# Patient Record
Sex: MALE | Race: OTHER | NOT HISPANIC OR LATINO
[De-identification: names, ages, dates, MRNs, and addresses within clinical notes are randomized per-mention and may not be internally consistent; named-entity substitution may affect disease eponyms.]

---

## 2017-03-20 ENCOUNTER — APPOINTMENT (OUTPATIENT)
Dept: CARDIOLOGY | Facility: CLINIC | Age: 52
End: 2017-03-20

## 2017-03-20 VITALS
HEART RATE: 56 BPM | DIASTOLIC BLOOD PRESSURE: 92 MMHG | WEIGHT: 255 LBS | BODY MASS INDEX: 35.7 KG/M2 | HEIGHT: 71 IN | SYSTOLIC BLOOD PRESSURE: 148 MMHG

## 2017-03-29 ENCOUNTER — APPOINTMENT (OUTPATIENT)
Dept: CARDIOLOGY | Facility: CLINIC | Age: 52
End: 2017-03-29

## 2017-08-08 ENCOUNTER — EMERGENCY (EMERGENCY)
Facility: HOSPITAL | Age: 52
LOS: 0 days | Discharge: HOME | End: 2017-08-08
Admitting: INTERNAL MEDICINE

## 2017-08-08 DIAGNOSIS — K02.9 DENTAL CARIES, UNSPECIFIED: ICD-10-CM

## 2017-08-08 DIAGNOSIS — E11.9 TYPE 2 DIABETES MELLITUS WITHOUT COMPLICATIONS: ICD-10-CM

## 2017-08-08 DIAGNOSIS — R06.09 OTHER FORMS OF DYSPNEA: ICD-10-CM

## 2017-08-08 DIAGNOSIS — I10 ESSENTIAL (PRIMARY) HYPERTENSION: ICD-10-CM

## 2017-08-08 DIAGNOSIS — E78.5 HYPERLIPIDEMIA, UNSPECIFIED: ICD-10-CM

## 2017-08-08 DIAGNOSIS — I25.10 ATHEROSCLEROTIC HEART DISEASE OF NATIVE CORONARY ARTERY WITHOUT ANGINA PECTORIS: ICD-10-CM

## 2017-08-08 DIAGNOSIS — I21.4 NON-ST ELEVATION (NSTEMI) MYOCARDIAL INFARCTION: ICD-10-CM

## 2017-08-08 DIAGNOSIS — K08.89 OTHER SPECIFIED DISORDERS OF TEETH AND SUPPORTING STRUCTURES: ICD-10-CM

## 2017-09-25 ENCOUNTER — OUTPATIENT (OUTPATIENT)
Dept: OUTPATIENT SERVICES | Facility: HOSPITAL | Age: 52
LOS: 1 days | Discharge: HOME | End: 2017-09-25

## 2017-09-25 DIAGNOSIS — E78.5 HYPERLIPIDEMIA, UNSPECIFIED: ICD-10-CM

## 2017-09-25 DIAGNOSIS — R06.09 OTHER FORMS OF DYSPNEA: ICD-10-CM

## 2017-09-25 DIAGNOSIS — I25.10 ATHEROSCLEROTIC HEART DISEASE OF NATIVE CORONARY ARTERY WITHOUT ANGINA PECTORIS: ICD-10-CM

## 2017-09-25 DIAGNOSIS — I21.4 NON-ST ELEVATION (NSTEMI) MYOCARDIAL INFARCTION: ICD-10-CM

## 2017-09-25 DIAGNOSIS — E11.9 TYPE 2 DIABETES MELLITUS WITHOUT COMPLICATIONS: ICD-10-CM

## 2017-09-25 DIAGNOSIS — R05 COUGH: ICD-10-CM

## 2017-09-25 DIAGNOSIS — I10 ESSENTIAL (PRIMARY) HYPERTENSION: ICD-10-CM

## 2018-10-15 ENCOUNTER — APPOINTMENT (OUTPATIENT)
Dept: CARDIOLOGY | Facility: CLINIC | Age: 53
End: 2018-10-15

## 2018-10-15 VITALS — HEIGHT: 71 IN | SYSTOLIC BLOOD PRESSURE: 164 MMHG | HEART RATE: 61 BPM | DIASTOLIC BLOOD PRESSURE: 112 MMHG

## 2019-01-07 ENCOUNTER — APPOINTMENT (OUTPATIENT)
Dept: CARDIOLOGY | Facility: CLINIC | Age: 54
End: 2019-01-07

## 2019-04-08 ENCOUNTER — APPOINTMENT (OUTPATIENT)
Dept: CARDIOLOGY | Facility: CLINIC | Age: 54
End: 2019-04-08
Payer: MEDICAID

## 2019-04-08 VITALS
HEIGHT: 71 IN | SYSTOLIC BLOOD PRESSURE: 132 MMHG | DIASTOLIC BLOOD PRESSURE: 80 MMHG | WEIGHT: 277 LBS | BODY MASS INDEX: 38.78 KG/M2 | HEART RATE: 58 BPM

## 2019-04-08 PROCEDURE — 93000 ELECTROCARDIOGRAM COMPLETE: CPT

## 2019-04-08 PROCEDURE — 99213 OFFICE O/P EST LOW 20 MIN: CPT

## 2019-04-08 NOTE — ASSESSMENT
[FreeTextEntry1] : CAD, s/p NSTEMI, s/p CABG\par Clinically stable form cardiac perspective.\par Atypical chest pain - obtained a stress test. no ischemia, fixed defect, c/w prior MI\par ECG unchanged.\par Patient advised to continue with medical therapy. F/u with PMD for labs for DM, lipid control.\par rx for lipids given.\par BP controlled - added Amlodipine.\par F/u in 3 months to re-assess.\par Secondary prevention discussed.

## 2019-04-08 NOTE — OBJECTIVE
[History reviewed] : History reviewed. [Medications and Allergies reviewed] : Medications and allergies reviewed. Clear

## 2019-04-08 NOTE — HISTORY OF PRESENT ILLNESS
[FreeTextEntry1] : 53 y/o male with a history of HTN, DL, DM, CAD, s/p MI, s/p CABG in 2014. No chest pain or dyspnea. Reports FS have been in the normal range. Continuing with diet and exercise. No recent labs. Reports compliance with medical therapy and diet. Stress test negative for ischemia.

## 2019-04-08 NOTE — PHYSICAL EXAM
[General Appearance - Well Developed] : well developed [General Appearance - Well Nourished] : well nourished [General Appearance - In No Acute Distress] : no acute distress [FreeTextEntry1] : overweight [Normal Conjunctiva] : the conjunctiva exhibited no abnormalities [Normal Jugular Venous V Waves Present] : normal jugular venous V waves present [] : no respiratory distress [Respiration, Rhythm And Depth] : normal respiratory rhythm and effort [Auscultation Breath Sounds / Voice Sounds] : lungs were clear to auscultation bilaterally [Heart Rate And Rhythm] : heart rate and rhythm were normal [Heart Sounds] : normal S1 and S2 [Murmurs] : no murmurs present [Edema] : no peripheral edema present [Bowel Sounds] : normal bowel sounds [Abdomen Soft] : soft [Abdomen Tenderness] : non-tender [Abnormal Walk] : normal gait [Nail Clubbing] : no clubbing of the fingernails [Cyanosis, Localized] : no localized cyanosis [Skin Color & Pigmentation] : normal skin color and pigmentation [Skin Turgor] : normal skin turgor [Oriented To Time, Place, And Person] : oriented to person, place, and time [Affect] : the affect was normal

## 2019-04-08 NOTE — REASON FOR VISIT
[Follow-Up - Clinic] : a clinic follow-up of [Coronary Artery Disease] : coronary artery disease [CABG Follow-up] : bypass graft [Hyperlipidemia] : hyperlipidemia

## 2019-04-08 NOTE — REVIEW OF SYSTEMS
[Fever] : no fever [Headache] : no headache [Recent Weight Loss (___ Lbs)] : recent [unfilled] ~Ulb weight loss [Joint Pain] : joint pain [see HPI] : see HPI [Negative] : Heme/Lymph

## 2019-10-14 ENCOUNTER — APPOINTMENT (OUTPATIENT)
Dept: CARDIOLOGY | Facility: CLINIC | Age: 54
End: 2019-10-14

## 2019-11-13 ENCOUNTER — APPOINTMENT (OUTPATIENT)
Dept: CARDIOLOGY | Facility: CLINIC | Age: 54
End: 2019-11-13
Payer: MEDICAID

## 2019-11-13 VITALS
SYSTOLIC BLOOD PRESSURE: 160 MMHG | HEIGHT: 71 IN | HEART RATE: 59 BPM | DIASTOLIC BLOOD PRESSURE: 110 MMHG | WEIGHT: 280 LBS | BODY MASS INDEX: 39.2 KG/M2

## 2019-11-13 PROCEDURE — 93000 ELECTROCARDIOGRAM COMPLETE: CPT

## 2019-11-13 PROCEDURE — 99214 OFFICE O/P EST MOD 30 MIN: CPT

## 2019-11-13 RX ORDER — FUROSEMIDE 40 MG/1
40 TABLET ORAL
Qty: 90 | Refills: 0 | Status: ACTIVE | COMMUNITY
Start: 2019-06-03

## 2019-11-13 RX ORDER — METOPROLOL SUCCINATE 200 MG/1
200 TABLET, EXTENDED RELEASE ORAL
Qty: 90 | Refills: 0 | Status: ACTIVE | COMMUNITY
Start: 2019-06-03

## 2019-11-13 RX ORDER — AMLODIPINE BESYLATE 5 MG/1
5 TABLET ORAL DAILY
Qty: 90 | Refills: 3 | Status: ACTIVE | COMMUNITY
Start: 2018-10-15 | End: 1900-01-01

## 2019-11-13 RX ORDER — BUPROPION HYDROCHLORIDE 100 MG/1
100 TABLET, FILM COATED, EXTENDED RELEASE ORAL
Qty: 180 | Refills: 0 | Status: ACTIVE | COMMUNITY
Start: 2019-06-03

## 2019-11-13 RX ORDER — FAMOTIDINE 20 MG/1
20 TABLET, FILM COATED ORAL
Qty: 60 | Refills: 0 | Status: ACTIVE | COMMUNITY
Start: 2019-06-03

## 2019-11-13 NOTE — ASSESSMENT
[FreeTextEntry1] : CAD, s/p NSTEMI, s/p CABG\par Clinically stable form cardiac perspective.\par Compliance discussed. Re-start BP meds and CPAP. Low salt diet, weight loss discussed.\par Atypical chest pain - obtained a stress test. no ischemia, fixed defect, c/w prior MI\par ECG unchanged.\par Patient advised to continue with medical therapy. F/u with PMD for labs for DM, lipid control.\par rx for lipids given.\par F/u with pulmonary for MICKIE\par BP uncontrolled - added Amlodipine.\par F/u in 3 months to re-assess.\par Secondary prevention discussed.

## 2019-11-13 NOTE — PHYSICAL EXAM
[General Appearance - Well Developed] : well developed [General Appearance - Well Nourished] : well nourished [General Appearance - In No Acute Distress] : no acute distress [FreeTextEntry1] : overweight [Normal Conjunctiva] : the conjunctiva exhibited no abnormalities [Normal Jugular Venous V Waves Present] : normal jugular venous V waves present [Heart Rate And Rhythm] : heart rate and rhythm were normal [Heart Sounds] : normal S1 and S2 [Murmurs] : no murmurs present [Edema] : no peripheral edema present [Respiration, Rhythm And Depth] : normal respiratory rhythm and effort [] : no respiratory distress [Bowel Sounds] : normal bowel sounds [Auscultation Breath Sounds / Voice Sounds] : lungs were clear to auscultation bilaterally [Abdomen Soft] : soft [Abdomen Tenderness] : non-tender [Abnormal Walk] : normal gait [Cyanosis, Localized] : no localized cyanosis [Nail Clubbing] : no clubbing of the fingernails [Skin Color & Pigmentation] : normal skin color and pigmentation [Skin Turgor] : normal skin turgor [Oriented To Time, Place, And Person] : oriented to person, place, and time [Affect] : the affect was normal

## 2019-11-13 NOTE — HISTORY OF PRESENT ILLNESS
[FreeTextEntry1] : 55 y/o male with a history of HTN, DL, DM, MICKIE, CAD, s/p MI, s/p CABG in 2014. No chest pain or dyspnea. Reports FS have been in the normal range. Continuing with diet and exercise. No recent labs. Non-compliant with BP meds - did not  amlodipine since the Summer. Non-compliant with CPAP. Stress test negative for ischemia.

## 2019-11-13 NOTE — REVIEW OF SYSTEMS
[Fever] : no fever [Headache] : no headache [Feeling Fatigued] : feeling fatigued [Joint Pain] : joint pain [Recent Weight Loss (___ Lbs)] : recent [unfilled] ~Ulb weight loss [see HPI] : see HPI [Negative] : Heme/Lymph

## 2021-11-01 ENCOUNTER — APPOINTMENT (OUTPATIENT)
Dept: CARDIOLOGY | Facility: CLINIC | Age: 56
End: 2021-11-01
Payer: MEDICAID

## 2021-11-01 VITALS
TEMPERATURE: 97.5 F | HEART RATE: 52 BPM | DIASTOLIC BLOOD PRESSURE: 88 MMHG | WEIGHT: 268 LBS | BODY MASS INDEX: 37.52 KG/M2 | SYSTOLIC BLOOD PRESSURE: 130 MMHG | HEIGHT: 71 IN

## 2021-11-01 PROCEDURE — 93000 ELECTROCARDIOGRAM COMPLETE: CPT

## 2021-11-01 PROCEDURE — 99214 OFFICE O/P EST MOD 30 MIN: CPT

## 2021-11-01 RX ORDER — CIPROFLOXACIN HYDROCHLORIDE 500 MG/1
500 TABLET, FILM COATED ORAL
Qty: 10 | Refills: 0 | Status: COMPLETED | COMMUNITY
Start: 2021-05-07 | End: 2021-11-01

## 2021-11-01 RX ORDER — GABAPENTIN 300 MG/1
300 CAPSULE ORAL
Qty: 270 | Refills: 0 | Status: ACTIVE | COMMUNITY
Start: 2021-07-28

## 2021-11-01 RX ORDER — ALLOPURINOL 100 MG/1
100 TABLET ORAL
Qty: 90 | Refills: 0 | Status: COMPLETED | COMMUNITY
Start: 2020-11-12 | End: 2021-11-01

## 2021-11-01 NOTE — ASSESSMENT
[FreeTextEntry1] : CAD, s/p NSTEMI, s/p CABG\par Obtain stress test to assess for ischemia.\par Compliance discussed. C/w BP meds and CPAP. Low salt diet, weight loss discussed.\par ECG noted.\par \par Edema - r/o DVT - schedule for LE DOppler\par Check labs, BNP, uric acid.\par \par Patient advised to continue with medical therapy.Check labs for DM, lipid control.\par rx given.\par F/u with pulmonary for MICKIE\par Will consider switching amlodipine to another agent.\par F/u in 1-2 months to re-assess.\par Secondary prevention discussed.\par Patient was advised about healthy lifestyle changes, including diet and exercise. Importance of sustained long-term weight loss was discussed, questions answered.\par

## 2021-11-01 NOTE — PHYSICAL EXAM
[General Appearance - Well Developed] : well developed [General Appearance - Well Nourished] : well nourished [General Appearance - In No Acute Distress] : no acute distress [Normal Conjunctiva] : the conjunctiva exhibited no abnormalities [Normal Jugular Venous V Waves Present] : normal jugular venous V waves present [Heart Rate And Rhythm] : heart rate and rhythm were normal [Heart Sounds] : normal S1 and S2 [Murmurs] : no murmurs present [FreeTextEntry1] : mild edema [] : no respiratory distress [Respiration, Rhythm And Depth] : normal respiratory rhythm and effort [Auscultation Breath Sounds / Voice Sounds] : lungs were clear to auscultation bilaterally [Bowel Sounds] : normal bowel sounds [Abdomen Soft] : soft [Abdomen Tenderness] : non-tender [Abnormal Walk] : normal gait [Nail Clubbing] : no clubbing of the fingernails [Cyanosis, Localized] : no localized cyanosis [Skin Color & Pigmentation] : normal skin color and pigmentation [Skin Turgor] : normal skin turgor [Oriented To Time, Place, And Person] : oriented to person, place, and time [Affect] : the affect was normal

## 2021-11-01 NOTE — HISTORY OF PRESENT ILLNESS
[FreeTextEntry1] : 55 y/o male with a history of HTN, DL, DM, MICKIE, CAD, s/p MI, s/p CABG in 2014. Presents for f/u after 2 years (was lost to f/u due to COVID pandemic).  Reports FS have been in the normal range. No recent labs. He had COVID-19 infection in May 2021, recovered. Continuing with diet and exercise. No recent labs.  Non-compliant with CPAP. Stress test in 2018 was negative for ischemia. Now c/o episodes of chest discomfort. + LE edema, right more than left. + LE pain.

## 2021-11-10 ENCOUNTER — APPOINTMENT (OUTPATIENT)
Dept: CARDIOLOGY | Facility: CLINIC | Age: 56
End: 2021-11-10
Payer: MEDICAID

## 2021-11-10 DIAGNOSIS — R60.0 LOCALIZED EDEMA: ICD-10-CM

## 2021-11-10 PROCEDURE — 93970 EXTREMITY STUDY: CPT

## 2021-11-17 LAB
ALBUMIN SERPL ELPH-MCNC: 4.5 G/DL
ALP BLD-CCNC: 106 U/L
ALT SERPL-CCNC: 12 U/L
ANION GAP SERPL CALC-SCNC: 16 MMOL/L
AST SERPL-CCNC: 13 U/L
BILIRUB SERPL-MCNC: 1.2 MG/DL
BUN SERPL-MCNC: 21 MG/DL
CALCIUM SERPL-MCNC: 9.3 MG/DL
CHLORIDE SERPL-SCNC: 103 MMOL/L
CHOLEST SERPL-MCNC: 161 MG/DL
CO2 SERPL-SCNC: 25 MMOL/L
COVID-19 SPIKE DOMAIN ANTIBODY INTERPRETATION: POSITIVE
CREAT SERPL-MCNC: 1.61 MG/DL
ESTIMATED AVERAGE GLUCOSE: 137 MG/DL
GLUCOSE SERPL-MCNC: 124 MG/DL
HBA1C MFR BLD HPLC: 6.4 %
HDLC SERPL-MCNC: 48 MG/DL
LDLC SERPL CALC-MCNC: 77 MG/DL
NONHDLC SERPL-MCNC: 112 MG/DL
POTASSIUM SERPL-SCNC: 4.6 MMOL/L
PROT SERPL-MCNC: 7.1 G/DL
SARS-COV-2 AB SERPL IA-ACNC: 56.3 U/ML
SODIUM SERPL-SCNC: 144 MMOL/L
TRIGL SERPL-MCNC: 178 MG/DL
URATE SERPL-MCNC: 5.9 MG/DL

## 2021-12-13 ENCOUNTER — APPOINTMENT (OUTPATIENT)
Dept: CARDIOLOGY | Facility: CLINIC | Age: 56
End: 2021-12-13

## 2023-08-04 ENCOUNTER — APPOINTMENT (OUTPATIENT)
Dept: ORTHOPEDIC SURGERY | Facility: CLINIC | Age: 58
End: 2023-08-04
Payer: MEDICAID

## 2023-08-04 VITALS — BODY MASS INDEX: 37.8 KG/M2 | WEIGHT: 270 LBS | HEIGHT: 71 IN

## 2023-08-04 DIAGNOSIS — M72.2 PLANTAR FASCIAL FIBROMATOSIS: ICD-10-CM

## 2023-08-04 PROCEDURE — 99203 OFFICE O/P NEW LOW 30 MIN: CPT

## 2023-08-04 PROCEDURE — 73650 X-RAY EXAM OF HEEL: CPT | Mod: LT

## 2023-08-04 NOTE — PHYSICAL EXAM
[de-identified] : He is alert oriented x3.  He is pleasant cooperative exam.  I examined the left lower extremity.  His skin is intact.  There are dystrophic nails present.  He is tender palpation at the origin of the plantar fascia.  Full range of motion of the ankle hindfoot midfoot forefoot.  Compartment soft compressible.  Neurovascular intact distally.

## 2023-08-04 NOTE — DISCUSSION/SUMMARY
[de-identified] : I discussed the patient's findings with him.  I think at this time he would benefit from a course of anti-inflammatory medication along with physical therapy/home exercise program and silicone heel cups.  He voiced understanding.  I will see him back as needed.

## 2023-08-04 NOTE — DATA REVIEWED
[FreeTextEntry1] : 2 views of the patient's calcaneus ordered and reviewed by me personally.  They reveal no evidence of fracture or dislocation.

## 2023-09-02 ENCOUNTER — INPATIENT (INPATIENT)
Facility: HOSPITAL | Age: 58
LOS: 2 days | Discharge: HOME | DRG: 174 | End: 2023-09-05
Attending: INTERNAL MEDICINE | Admitting: INTERNAL MEDICINE
Payer: MEDICAID

## 2023-09-02 VITALS
DIASTOLIC BLOOD PRESSURE: 83 MMHG | HEART RATE: 68 BPM | WEIGHT: 274.92 LBS | SYSTOLIC BLOOD PRESSURE: 162 MMHG | TEMPERATURE: 98 F | OXYGEN SATURATION: 97 % | RESPIRATION RATE: 18 BRPM

## 2023-09-02 DIAGNOSIS — Z95.1 PRESENCE OF AORTOCORONARY BYPASS GRAFT: ICD-10-CM

## 2023-09-02 DIAGNOSIS — E78.5 HYPERLIPIDEMIA, UNSPECIFIED: ICD-10-CM

## 2023-09-02 DIAGNOSIS — I34.0 NONRHEUMATIC MITRAL (VALVE) INSUFFICIENCY: ICD-10-CM

## 2023-09-02 DIAGNOSIS — M10.9 GOUT, UNSPECIFIED: ICD-10-CM

## 2023-09-02 DIAGNOSIS — G47.33 OBSTRUCTIVE SLEEP APNEA (ADULT) (PEDIATRIC): ICD-10-CM

## 2023-09-02 DIAGNOSIS — I25.10 ATHEROSCLEROTIC HEART DISEASE OF NATIVE CORONARY ARTERY WITHOUT ANGINA PECTORIS: ICD-10-CM

## 2023-09-02 DIAGNOSIS — R60.0 LOCALIZED EDEMA: ICD-10-CM

## 2023-09-02 DIAGNOSIS — K21.9 GASTRO-ESOPHAGEAL REFLUX DISEASE WITHOUT ESOPHAGITIS: ICD-10-CM

## 2023-09-02 DIAGNOSIS — I42.8 OTHER CARDIOMYOPATHIES: ICD-10-CM

## 2023-09-02 DIAGNOSIS — I21.4 NON-ST ELEVATION (NSTEMI) MYOCARDIAL INFARCTION: ICD-10-CM

## 2023-09-02 DIAGNOSIS — Z79.82 LONG TERM (CURRENT) USE OF ASPIRIN: ICD-10-CM

## 2023-09-02 DIAGNOSIS — E11.40 TYPE 2 DIABETES MELLITUS WITH DIABETIC NEUROPATHY, UNSPECIFIED: ICD-10-CM

## 2023-09-02 DIAGNOSIS — I25.84 CORONARY ATHEROSCLEROSIS DUE TO CALCIFIED CORONARY LESION: ICD-10-CM

## 2023-09-02 DIAGNOSIS — I10 ESSENTIAL (PRIMARY) HYPERTENSION: ICD-10-CM

## 2023-09-02 DIAGNOSIS — I25.2 OLD MYOCARDIAL INFARCTION: ICD-10-CM

## 2023-09-02 DIAGNOSIS — Z79.84 LONG TERM (CURRENT) USE OF ORAL HYPOGLYCEMIC DRUGS: ICD-10-CM

## 2023-09-02 DIAGNOSIS — I25.810 ATHEROSCLEROSIS OF CORONARY ARTERY BYPASS GRAFT(S) WITHOUT ANGINA PECTORIS: ICD-10-CM

## 2023-09-02 PROCEDURE — 93010 ELECTROCARDIOGRAM REPORT: CPT

## 2023-09-02 PROCEDURE — 99285 EMERGENCY DEPT VISIT HI MDM: CPT

## 2023-09-03 DIAGNOSIS — R07.9 CHEST PAIN, UNSPECIFIED: ICD-10-CM

## 2023-09-03 DIAGNOSIS — Z95.1 PRESENCE OF AORTOCORONARY BYPASS GRAFT: Chronic | ICD-10-CM

## 2023-09-03 LAB
ALBUMIN SERPL ELPH-MCNC: 4 G/DL — SIGNIFICANT CHANGE UP (ref 3.5–5.2)
ALP SERPL-CCNC: 92 U/L — SIGNIFICANT CHANGE UP (ref 30–115)
ALT FLD-CCNC: 26 U/L — SIGNIFICANT CHANGE UP (ref 0–41)
ANION GAP SERPL CALC-SCNC: 13 MMOL/L — SIGNIFICANT CHANGE UP (ref 7–14)
APTT BLD: 29 SEC — SIGNIFICANT CHANGE UP (ref 27–39.2)
APTT BLD: 37.1 SEC — SIGNIFICANT CHANGE UP (ref 27–39.2)
APTT BLD: 38 SEC — SIGNIFICANT CHANGE UP (ref 27–39.2)
AST SERPL-CCNC: 49 U/L — HIGH (ref 0–41)
BASOPHILS # BLD AUTO: 0.04 K/UL — SIGNIFICANT CHANGE UP (ref 0–0.2)
BASOPHILS NFR BLD AUTO: 0.4 % — SIGNIFICANT CHANGE UP (ref 0–1)
BILIRUB SERPL-MCNC: 0.4 MG/DL — SIGNIFICANT CHANGE UP (ref 0.2–1.2)
BUN SERPL-MCNC: 32 MG/DL — HIGH (ref 10–20)
CALCIUM SERPL-MCNC: 8.7 MG/DL — SIGNIFICANT CHANGE UP (ref 8.4–10.5)
CHLORIDE SERPL-SCNC: 104 MMOL/L — SIGNIFICANT CHANGE UP (ref 98–110)
CHOLEST SERPL-MCNC: 180 MG/DL — SIGNIFICANT CHANGE UP
CO2 SERPL-SCNC: 24 MMOL/L — SIGNIFICANT CHANGE UP (ref 17–32)
CREAT SERPL-MCNC: 1.7 MG/DL — HIGH (ref 0.7–1.5)
EGFR: 46 ML/MIN/1.73M2 — LOW
EOSINOPHIL # BLD AUTO: 0.12 K/UL — SIGNIFICANT CHANGE UP (ref 0–0.7)
EOSINOPHIL NFR BLD AUTO: 1.1 % — SIGNIFICANT CHANGE UP (ref 0–8)
GLUCOSE BLDC GLUCOMTR-MCNC: 138 MG/DL — HIGH (ref 70–99)
GLUCOSE BLDC GLUCOMTR-MCNC: 140 MG/DL — HIGH (ref 70–99)
GLUCOSE BLDC GLUCOMTR-MCNC: 181 MG/DL — HIGH (ref 70–99)
GLUCOSE BLDC GLUCOMTR-MCNC: 229 MG/DL — HIGH (ref 70–99)
GLUCOSE BLDC GLUCOMTR-MCNC: 274 MG/DL — HIGH (ref 70–99)
GLUCOSE SERPL-MCNC: 213 MG/DL — HIGH (ref 70–99)
HCT VFR BLD CALC: 39.2 % — LOW (ref 42–52)
HDLC SERPL-MCNC: 37 MG/DL — LOW
HGB BLD-MCNC: 13.1 G/DL — LOW (ref 14–18)
IMM GRANULOCYTES NFR BLD AUTO: 0.6 % — HIGH (ref 0.1–0.3)
INR BLD: 0.96 RATIO — SIGNIFICANT CHANGE UP (ref 0.65–1.3)
LIPID PNL WITH DIRECT LDL SERPL: 88 MG/DL — SIGNIFICANT CHANGE UP
LYMPHOCYTES # BLD AUTO: 3.29 K/UL — SIGNIFICANT CHANGE UP (ref 1.2–3.4)
LYMPHOCYTES # BLD AUTO: 31 % — SIGNIFICANT CHANGE UP (ref 20.5–51.1)
MAGNESIUM SERPL-MCNC: 2.1 MG/DL — SIGNIFICANT CHANGE UP (ref 1.8–2.4)
MCHC RBC-ENTMCNC: 31.1 PG — HIGH (ref 27–31)
MCHC RBC-ENTMCNC: 33.4 G/DL — SIGNIFICANT CHANGE UP (ref 32–37)
MCV RBC AUTO: 93.1 FL — SIGNIFICANT CHANGE UP (ref 80–94)
MONOCYTES # BLD AUTO: 0.64 K/UL — HIGH (ref 0.1–0.6)
MONOCYTES NFR BLD AUTO: 6 % — SIGNIFICANT CHANGE UP (ref 1.7–9.3)
NEUTROPHILS # BLD AUTO: 6.45 K/UL — SIGNIFICANT CHANGE UP (ref 1.4–6.5)
NEUTROPHILS NFR BLD AUTO: 60.9 % — SIGNIFICANT CHANGE UP (ref 42.2–75.2)
NON HDL CHOLESTEROL: 143 MG/DL — HIGH
NRBC # BLD: 0 /100 WBCS — SIGNIFICANT CHANGE UP (ref 0–0)
NT-PROBNP SERPL-SCNC: 1475 PG/ML — HIGH (ref 0–300)
PLATELET # BLD AUTO: 179 K/UL — SIGNIFICANT CHANGE UP (ref 130–400)
PMV BLD: 12.7 FL — HIGH (ref 7.4–10.4)
POTASSIUM SERPL-MCNC: 4.3 MMOL/L — SIGNIFICANT CHANGE UP (ref 3.5–5)
POTASSIUM SERPL-SCNC: 4.3 MMOL/L — SIGNIFICANT CHANGE UP (ref 3.5–5)
PROT SERPL-MCNC: 6.4 G/DL — SIGNIFICANT CHANGE UP (ref 6–8)
PROTHROM AB SERPL-ACNC: 10.9 SEC — SIGNIFICANT CHANGE UP (ref 9.95–12.87)
RBC # BLD: 4.21 M/UL — LOW (ref 4.7–6.1)
RBC # FLD: 13.5 % — SIGNIFICANT CHANGE UP (ref 11.5–14.5)
SODIUM SERPL-SCNC: 141 MMOL/L — SIGNIFICANT CHANGE UP (ref 135–146)
TRIGL SERPL-MCNC: 275 MG/DL — HIGH
TROPONIN T SERPL-MCNC: 0.32 NG/ML — CRITICAL HIGH
TROPONIN T SERPL-MCNC: 0.33 NG/ML — CRITICAL HIGH
TROPONIN T SERPL-MCNC: 0.61 NG/ML — CRITICAL HIGH
TROPONIN T SERPL-MCNC: 0.73 NG/ML — CRITICAL HIGH
WBC # BLD: 10.6 K/UL — SIGNIFICANT CHANGE UP (ref 4.8–10.8)
WBC # FLD AUTO: 10.6 K/UL — SIGNIFICANT CHANGE UP (ref 4.8–10.8)

## 2023-09-03 PROCEDURE — 99223 1ST HOSP IP/OBS HIGH 75: CPT

## 2023-09-03 PROCEDURE — 84439 ASSAY OF FREE THYROXINE: CPT

## 2023-09-03 PROCEDURE — C1725: CPT

## 2023-09-03 PROCEDURE — 93010 ELECTROCARDIOGRAM REPORT: CPT

## 2023-09-03 PROCEDURE — C1884: CPT

## 2023-09-03 PROCEDURE — 93010 ELECTROCARDIOGRAM REPORT: CPT | Mod: 77

## 2023-09-03 PROCEDURE — C1887: CPT

## 2023-09-03 PROCEDURE — C1769: CPT

## 2023-09-03 PROCEDURE — C1894: CPT

## 2023-09-03 PROCEDURE — C9606: CPT | Mod: RC

## 2023-09-03 PROCEDURE — C1753: CPT

## 2023-09-03 PROCEDURE — 84484 ASSAY OF TROPONIN QUANT: CPT

## 2023-09-03 PROCEDURE — 85730 THROMBOPLASTIN TIME PARTIAL: CPT

## 2023-09-03 PROCEDURE — 86803 HEPATITIS C AB TEST: CPT

## 2023-09-03 PROCEDURE — 83880 ASSAY OF NATRIURETIC PEPTIDE: CPT

## 2023-09-03 PROCEDURE — 85027 COMPLETE CBC AUTOMATED: CPT

## 2023-09-03 PROCEDURE — 93306 TTE W/DOPPLER COMPLETE: CPT | Mod: 26

## 2023-09-03 PROCEDURE — C1874: CPT

## 2023-09-03 PROCEDURE — 82962 GLUCOSE BLOOD TEST: CPT

## 2023-09-03 PROCEDURE — 83036 HEMOGLOBIN GLYCOSYLATED A1C: CPT

## 2023-09-03 PROCEDURE — 80048 BASIC METABOLIC PNL TOTAL CA: CPT

## 2023-09-03 PROCEDURE — 83735 ASSAY OF MAGNESIUM: CPT

## 2023-09-03 PROCEDURE — 93459 L HRT ART/GRFT ANGIO: CPT | Mod: 59

## 2023-09-03 PROCEDURE — 36415 COLL VENOUS BLD VENIPUNCTURE: CPT

## 2023-09-03 PROCEDURE — 85610 PROTHROMBIN TIME: CPT

## 2023-09-03 PROCEDURE — 93005 ELECTROCARDIOGRAM TRACING: CPT

## 2023-09-03 PROCEDURE — 84443 ASSAY THYROID STIM HORMONE: CPT

## 2023-09-03 PROCEDURE — 93970 EXTREMITY STUDY: CPT

## 2023-09-03 PROCEDURE — 80053 COMPREHEN METABOLIC PANEL: CPT

## 2023-09-03 PROCEDURE — 80061 LIPID PANEL: CPT

## 2023-09-03 PROCEDURE — 71045 X-RAY EXAM CHEST 1 VIEW: CPT | Mod: 26

## 2023-09-03 PROCEDURE — 92978 ENDOLUMINL IVUS OCT C 1ST: CPT | Mod: RC

## 2023-09-03 PROCEDURE — 93306 TTE W/DOPPLER COMPLETE: CPT

## 2023-09-03 RX ORDER — GABAPENTIN 400 MG/1
300 CAPSULE ORAL THREE TIMES A DAY
Refills: 0 | Status: DISCONTINUED | OUTPATIENT
Start: 2023-09-03 | End: 2023-09-05

## 2023-09-03 RX ORDER — FUROSEMIDE 40 MG
1 TABLET ORAL
Refills: 0 | DISCHARGE

## 2023-09-03 RX ORDER — HEPARIN SODIUM 5000 [USP'U]/ML
INJECTION INTRAVENOUS; SUBCUTANEOUS
Qty: 25000 | Refills: 0 | Status: DISCONTINUED | OUTPATIENT
Start: 2023-09-03 | End: 2023-09-03

## 2023-09-03 RX ORDER — FAMOTIDINE 10 MG/ML
1 INJECTION INTRAVENOUS
Refills: 0 | DISCHARGE

## 2023-09-03 RX ORDER — INSULIN LISPRO 100/ML
VIAL (ML) SUBCUTANEOUS
Refills: 0 | Status: DISCONTINUED | OUTPATIENT
Start: 2023-09-03 | End: 2023-09-05

## 2023-09-03 RX ORDER — BUPROPION HYDROCHLORIDE 150 MG/1
1 TABLET, EXTENDED RELEASE ORAL
Refills: 0 | DISCHARGE

## 2023-09-03 RX ORDER — AMLODIPINE BESYLATE 2.5 MG/1
1 TABLET ORAL
Refills: 0 | DISCHARGE

## 2023-09-03 RX ORDER — AMLODIPINE BESYLATE 2.5 MG/1
5 TABLET ORAL DAILY
Refills: 0 | Status: DISCONTINUED | OUTPATIENT
Start: 2023-09-03 | End: 2023-09-05

## 2023-09-03 RX ORDER — HEPARIN SODIUM 5000 [USP'U]/ML
6000 INJECTION INTRAVENOUS; SUBCUTANEOUS EVERY 6 HOURS
Refills: 0 | Status: DISCONTINUED | OUTPATIENT
Start: 2023-09-03 | End: 2023-09-04

## 2023-09-03 RX ORDER — FAMOTIDINE 10 MG/ML
20 INJECTION INTRAVENOUS
Refills: 0 | Status: DISCONTINUED | OUTPATIENT
Start: 2023-09-03 | End: 2023-09-05

## 2023-09-03 RX ORDER — INFLUENZA VIRUS VACCINE 15; 15; 15; 15 UG/.5ML; UG/.5ML; UG/.5ML; UG/.5ML
0.5 SUSPENSION INTRAMUSCULAR ONCE
Refills: 0 | Status: DISCONTINUED | OUTPATIENT
Start: 2023-09-03 | End: 2023-09-05

## 2023-09-03 RX ORDER — GLUCAGON INJECTION, SOLUTION 0.5 MG/.1ML
1 INJECTION, SOLUTION SUBCUTANEOUS ONCE
Refills: 0 | Status: DISCONTINUED | OUTPATIENT
Start: 2023-09-03 | End: 2023-09-05

## 2023-09-03 RX ORDER — HEPARIN SODIUM 5000 [USP'U]/ML
5000 INJECTION INTRAVENOUS; SUBCUTANEOUS ONCE
Refills: 0 | Status: COMPLETED | OUTPATIENT
Start: 2023-09-03 | End: 2023-09-03

## 2023-09-03 RX ORDER — GABAPENTIN 400 MG/1
0 CAPSULE ORAL
Refills: 0 | DISCHARGE

## 2023-09-03 RX ORDER — ASPIRIN/CALCIUM CARB/MAGNESIUM 324 MG
81 TABLET ORAL DAILY
Refills: 0 | Status: DISCONTINUED | OUTPATIENT
Start: 2023-09-03 | End: 2023-09-05

## 2023-09-03 RX ORDER — SODIUM CHLORIDE 9 MG/ML
1000 INJECTION, SOLUTION INTRAVENOUS
Refills: 0 | Status: DISCONTINUED | OUTPATIENT
Start: 2023-09-03 | End: 2023-09-05

## 2023-09-03 RX ORDER — SIMVASTATIN 20 MG/1
40 TABLET, FILM COATED ORAL AT BEDTIME
Refills: 0 | Status: DISCONTINUED | OUTPATIENT
Start: 2023-09-03 | End: 2023-09-03

## 2023-09-03 RX ORDER — DEXTROSE 50 % IN WATER 50 %
12.5 SYRINGE (ML) INTRAVENOUS ONCE
Refills: 0 | Status: DISCONTINUED | OUTPATIENT
Start: 2023-09-03 | End: 2023-09-05

## 2023-09-03 RX ORDER — METOPROLOL TARTRATE 50 MG
200 TABLET ORAL DAILY
Refills: 0 | Status: DISCONTINUED | OUTPATIENT
Start: 2023-09-03 | End: 2023-09-05

## 2023-09-03 RX ORDER — ALLOPURINOL 300 MG
1 TABLET ORAL
Refills: 0 | DISCHARGE

## 2023-09-03 RX ORDER — FUROSEMIDE 40 MG
40 TABLET ORAL DAILY
Refills: 0 | Status: DISCONTINUED | OUTPATIENT
Start: 2023-09-03 | End: 2023-09-05

## 2023-09-03 RX ORDER — HEPARIN SODIUM 5000 [USP'U]/ML
6000 INJECTION INTRAVENOUS; SUBCUTANEOUS EVERY 6 HOURS
Refills: 0 | Status: DISCONTINUED | OUTPATIENT
Start: 2023-09-03 | End: 2023-09-03

## 2023-09-03 RX ORDER — ASPIRIN/CALCIUM CARB/MAGNESIUM 324 MG
324 TABLET ORAL ONCE
Refills: 0 | Status: COMPLETED | OUTPATIENT
Start: 2023-09-03 | End: 2023-09-03

## 2023-09-03 RX ORDER — ENOXAPARIN SODIUM 100 MG/ML
125 INJECTION SUBCUTANEOUS EVERY 24 HOURS
Refills: 0 | Status: DISCONTINUED | OUTPATIENT
Start: 2023-09-03 | End: 2023-09-03

## 2023-09-03 RX ORDER — DEXTROSE 50 % IN WATER 50 %
25 SYRINGE (ML) INTRAVENOUS ONCE
Refills: 0 | Status: DISCONTINUED | OUTPATIENT
Start: 2023-09-03 | End: 2023-09-05

## 2023-09-03 RX ORDER — HEPARIN SODIUM 5000 [USP'U]/ML
INJECTION INTRAVENOUS; SUBCUTANEOUS
Qty: 25000 | Refills: 0 | Status: DISCONTINUED | OUTPATIENT
Start: 2023-09-03 | End: 2023-09-04

## 2023-09-03 RX ORDER — CLOPIDOGREL BISULFATE 75 MG/1
300 TABLET, FILM COATED ORAL ONCE
Refills: 0 | Status: COMPLETED | OUTPATIENT
Start: 2023-09-03 | End: 2023-09-03

## 2023-09-03 RX ORDER — ATORVASTATIN CALCIUM 80 MG/1
80 TABLET, FILM COATED ORAL AT BEDTIME
Refills: 0 | Status: DISCONTINUED | OUTPATIENT
Start: 2023-09-03 | End: 2023-09-05

## 2023-09-03 RX ORDER — DEXTROSE 50 % IN WATER 50 %
15 SYRINGE (ML) INTRAVENOUS ONCE
Refills: 0 | Status: DISCONTINUED | OUTPATIENT
Start: 2023-09-03 | End: 2023-09-05

## 2023-09-03 RX ORDER — ALLOPURINOL 300 MG
300 TABLET ORAL DAILY
Refills: 0 | Status: DISCONTINUED | OUTPATIENT
Start: 2023-09-03 | End: 2023-09-05

## 2023-09-03 RX ORDER — ENOXAPARIN SODIUM 100 MG/ML
125 INJECTION SUBCUTANEOUS EVERY 12 HOURS
Refills: 0 | Status: DISCONTINUED | OUTPATIENT
Start: 2023-09-03 | End: 2023-09-03

## 2023-09-03 RX ORDER — METOPROLOL TARTRATE 50 MG
1 TABLET ORAL
Refills: 0 | DISCHARGE

## 2023-09-03 RX ORDER — INSULIN LISPRO 100/ML
VIAL (ML) SUBCUTANEOUS AT BEDTIME
Refills: 0 | Status: DISCONTINUED | OUTPATIENT
Start: 2023-09-03 | End: 2023-09-05

## 2023-09-03 RX ORDER — ASPIRIN/CALCIUM CARB/MAGNESIUM 324 MG
1 TABLET ORAL
Refills: 0 | DISCHARGE

## 2023-09-03 RX ORDER — CLOPIDOGREL BISULFATE 75 MG/1
75 TABLET, FILM COATED ORAL DAILY
Refills: 0 | Status: DISCONTINUED | OUTPATIENT
Start: 2023-09-04 | End: 2023-09-05

## 2023-09-03 RX ADMIN — Medication 200 MILLIGRAM(S): at 05:24

## 2023-09-03 RX ADMIN — FAMOTIDINE 20 MILLIGRAM(S): 10 INJECTION INTRAVENOUS at 05:25

## 2023-09-03 RX ADMIN — GABAPENTIN 300 MILLIGRAM(S): 400 CAPSULE ORAL at 05:24

## 2023-09-03 RX ADMIN — HEPARIN SODIUM 1000 UNIT(S)/HR: 5000 INJECTION INTRAVENOUS; SUBCUTANEOUS at 04:43

## 2023-09-03 RX ADMIN — HEPARIN SODIUM 1300 UNIT(S)/HR: 5000 INJECTION INTRAVENOUS; SUBCUTANEOUS at 11:50

## 2023-09-03 RX ADMIN — HEPARIN SODIUM 1000 UNIT(S)/HR: 5000 INJECTION INTRAVENOUS; SUBCUTANEOUS at 04:58

## 2023-09-03 RX ADMIN — Medication 81 MILLIGRAM(S): at 11:07

## 2023-09-03 RX ADMIN — CLOPIDOGREL BISULFATE 300 MILLIGRAM(S): 75 TABLET, FILM COATED ORAL at 11:07

## 2023-09-03 RX ADMIN — Medication 1: at 17:44

## 2023-09-03 RX ADMIN — Medication 324 MILLIGRAM(S): at 00:55

## 2023-09-03 RX ADMIN — Medication 3: at 12:49

## 2023-09-03 RX ADMIN — GABAPENTIN 300 MILLIGRAM(S): 400 CAPSULE ORAL at 13:29

## 2023-09-03 RX ADMIN — ATORVASTATIN CALCIUM 80 MILLIGRAM(S): 80 TABLET, FILM COATED ORAL at 21:16

## 2023-09-03 RX ADMIN — AMLODIPINE BESYLATE 5 MILLIGRAM(S): 2.5 TABLET ORAL at 05:25

## 2023-09-03 RX ADMIN — Medication 40 MILLIGRAM(S): at 05:23

## 2023-09-03 RX ADMIN — HEPARIN SODIUM 5000 UNIT(S): 5000 INJECTION INTRAVENOUS; SUBCUTANEOUS at 04:43

## 2023-09-03 RX ADMIN — HEPARIN SODIUM 1600 UNIT(S)/HR: 5000 INJECTION INTRAVENOUS; SUBCUTANEOUS at 18:55

## 2023-09-03 RX ADMIN — GABAPENTIN 300 MILLIGRAM(S): 400 CAPSULE ORAL at 21:16

## 2023-09-03 RX ADMIN — FAMOTIDINE 20 MILLIGRAM(S): 10 INJECTION INTRAVENOUS at 17:48

## 2023-09-03 RX ADMIN — Medication 300 MILLIGRAM(S): at 11:07

## 2023-09-03 NOTE — ED PROVIDER NOTE - PROGRESS NOTE DETAILS
BF: EKG with isolated ELIZABETH in III approximately 1 mm, no reciprocal depressions, subtle TWI in V5 and V6, but otherwise normal EKG. Repeat was mostly unchanged, perhaps slightly increased ELIZABETH in III.     CXR was clear. BNP slightly elevated. Remainder of labs reassuring. Pt was given ASA and was admitted to cardiac tele for possible NSTEMI

## 2023-09-03 NOTE — H&P ADULT - NS ATTEND AMEND GEN_ALL_CORE FT
Agree with assessment and plan above, unless otherwise documented in my attestation.    Mr Villarreal is a 58yoM with PMhx of CAD s/p CABG in 2014, HTN, HLD, and NIDDM who presented with chest pain radiating to his jaw on Friday-Saturday. Although the pain resolved, he continued to feel fatigued so was convinced to come to ED. Labs and ECG confirm that he is presenting with NSTEMI, likely a late presentation.  -Received ASA load and started on heparin drip  -Load with plavix   -Switch simvastatin to atorvastatin  -Plan for LHC on Tuesday, or sooner if he develops chest pain or new ECG changes. Will try to get records from [facility].  -TTE  -Continue metoprolol  -Order lipids, A1c

## 2023-09-03 NOTE — ED PROVIDER NOTE - PHYSICAL EXAMINATION
VITAL SIGNS: I have reviewed nursing notes and confirm.  CONSTITUTIONAL: well-appearing, non-toxic, NAD  SKIN: Warm dry, normal skin turgor  HEAD: NCAT  CARD: RRR, no murmurs, rubs or gallops  RESP: clear to ausculation b/l.  No rales, rhonchi, or wheezing.  ABD: soft, non-tender, non-distended, no rebound or guarding. No CVA tenderness  EXT: Full ROM, no bony tenderness, +1 pedal edema bilaterally   PSYCH: Cooperative, appropriate.

## 2023-09-03 NOTE — ED ADULT NURSE NOTE - OBJECTIVE STATEMENT
Pt is a 58 year old male c/o midsternal chest pain. Pt states chest pain started yesterday and got worse today. Pt is a&o x3 in no acute distress. Cardiac monitor on and audible. All comfort and safety measures initiated and maintained.

## 2023-09-03 NOTE — H&P ADULT - NSHPPHYSICALEXAM_GEN_ALL_CORE
VITALS:   T(C): 36.6 (09-02-23 @ 23:15), Max: 36.6 (09-02-23 @ 23:15)  HR: 68 (09-02-23 @ 23:15) (68 - 68)  BP: 162/83 (09-02-23 @ 23:15) (162/83 - 162/83)  RR: 18 (09-02-23 @ 23:15) (18 - 18)  SpO2: 97% (09-02-23 @ 23:15) (97% - 97%)    GENERAL: NAD, lying in bed comfortably  HEAD:  Atraumatic, normocephalic  EYES: EOMI, PERRLA, conjunctiva and sclera clear  ENT: Moist mucous membranes  NECK: Supple, no JVD  HEART: Regular rate and rhythm, no murmurs, rubs, or gallops  LUNGS: Unlabored respirations.  Clear to auscultation bilaterally, no crackles, wheezing, or rhonchi  ABDOMEN: Soft, nontender, nondistended, +BS  EXTREMITIES: 2+ peripheral pulses bilaterally. No clubbing, cyanosis, or edema  NERVOUS SYSTEM:  A&Ox3, no focal deficits   SKIN: No rashes or lesions VITALS:   T(C): 36.6 (09-02-23 @ 23:15), Max: 36.6 (09-02-23 @ 23:15)  HR: 68 (09-02-23 @ 23:15) (68 - 68)  BP: 162/83 (09-02-23 @ 23:15) (162/83 - 162/83)  RR: 18 (09-02-23 @ 23:15) (18 - 18)  SpO2: 97% (09-02-23 @ 23:15) (97% - 97%)    GENERAL: NAD, lying in bed comfortably  HEAD:  Atraumatic, normocephalic  EYES: EOMI, PERRLA, conjunctiva and sclera clear  ENT: Moist mucous membranes  NECK: Supple, no JVD  HEART: Regular rate and rhythm, no murmurs, rubs, or gallops  LUNGS: Unlabored respirations.  Clear to auscultation bilaterally, no crackles, wheezing, or rhonchi  ABDOMEN: Soft, nontender, nondistended, +BS  EXTREMITIES: 2+ peripheral pulses bilaterally. B/L LE edema +1-2 pitting. No clubbing, cyanosis.  NERVOUS SYSTEM:  A&Ox3, no focal deficits   SKIN: No rashes or lesions

## 2023-09-03 NOTE — H&P ADULT - HISTORY OF PRESENT ILLNESS
58 year old male with PMHx of CAD s/p NSTEMI, s/p CABG (2014), HTN, HLD, T2DM, MICKIE (non-compliant with CPAP), GERD, gout, and neuropathy who presented to the ED for acute onset of      In the ED: VS 97.9F, HR 68, /83, RR 18, O2 sat 97% on room air. ECG showed SR w/ PVCs HR 66 bpm, with TWI in inferior lateral leads. Labs were significant for elevated troponin of 0.32. CXR negative. Pt was given  mg x1.     Of note, patient was last seen at Dr. Keating's office in 2021. Pt went to follow-up appointment with c/o chest pain and LE edema in which he was suppose to undergo stress test to r/o ischemia and LE venous dopplers to r/o dvt however, ____.  Stress test in 2018 was negative for ischemia.   58 year old male with PMHx of CAD s/p NSTEMI, s/p CABG (2014), HTN, HLD, T2DM, MICKIE (non-compliant with CPAP), GERD, gout, and neuropathy who presented to the ED for acute onset of chest pain associated with shortness breath on exertion for 1 day. Pt reports he was walking his GF to a friend's house for dinner when he suddenly felt a midsternal chest pressure, rating 7/10 with mild SOB for a few minutes that was partially relieved with rest. Pt states once he felt okay to walk again, he started to have worsening SOB walking on an incline and having to make frequent stops to catch his breath; which he reports does not usually happen. Pt reports normally he is able to walk 5-6 blocks without feeling short of breath. Pt then reports around early Saturday morning around 2 AM he started to experience a chest pressure like feeling radiating to left shoulder, up to b/l jaw, and left lateral side towards underneath his breast bone. He states he took a dose of his Pepcid 20 mg x1 without relief. He also reports calling an ambulance at the time and said "they only did an ECG and said it was okay but, recommended for me to go to the hospital because they don't have an ECG to compare it to and also to do for further work-up and to get labs done so I decided to come tonight." He reports the chest pressure was still present but not as strong as his initial episode but left him an unsettling feeling that prompted him to the ED. He also mentions he is compliant with his medications. Pt denies fevers/chills, dizziness, N/V, recent travel, and recent illness.       In the ED: VS 97.9F, HR 68, /83, RR 18, O2 sat 97% on room air. ECG showed SR w/ PVCs HR 66 bpm, with TWI in inferior lateral leads. Labs were significant for elevated troponin of 0.32. CXR negative. Pt was given  mg x1.     Of note, patient was last seen at Dr. Keating's office in 2021. Pt went to follow-up appointment with c/o chest pain and LE edema in which he was suppose to undergo stress test to r/o ischemia and LE venous dopplers to r/o dvt however, pt reports he did go for stress test but was not able to follow-up results due to changes in his insurance. Pt reports he did not know he was suppose to undergo LE dopplers. Pt states his legs intermittently become swollen but reports once he takes his lasix and elevate his legs, the swelling improves.   Stress test in 2018 was negative for ischemia.

## 2023-09-03 NOTE — H&P ADULT - NSICDXPASTMEDICALHX_GEN_ALL_CORE_FT
PAST MEDICAL HISTORY:  CAD (coronary artery disease)     Diabetes mellitus, type 2     GERD (gastroesophageal reflux disease)     Gout     History of peripheral neuropathy     HLD (hyperlipidemia)     HTN (hypertension)     NSTEMI (non-ST elevation myocardial infarction)     MICKIE (obstructive sleep apnea)

## 2023-09-03 NOTE — H&P ADULT - ASSESSMENT
Assessment:  58 year old male with PMHx of CAD s/p NSTEMI, s/p CABG (2014), HTN, HLD, T2DM, MICKIE (non-compliant with CPAP), GERD, gout, and neuropathy who presented to the ED for acute onset of chest pain   Pt admitted to cardiac telemetry for NSTEMI and ischemic work-up.     Problems discussed and associated plan:  #Chest pain  #Elevated troponin, CAD s/p NSTEMI, s/p CABG   - Trend troponin  - Start on Lovenox 125 mg BID   - Continue with ASA 81 mg qd  - f/u lipid panel, TSH, FT4, HBA1C in AM   - Obtain TTE  - Plan for CCTA    #HTN    #HLD    #T2DM  #Neuropathy    #Gout    #MICKIE    #GERD      Please contact me with any questions or concerns at [phone]. Assessment:  58 year old male with PMHx of CAD s/p NSTEMI, s/p CABG (2014), HTN, HLD, T2DM, MICKIE (non-compliant with CPAP), GERD, gout, and neuropathy who presented to the ED for acute onset of chest pain   Pt admitted to cardiac telemetry for NSTEMI and ischemic work-up.     Problems discussed and associated plan:  #Chest pain  #Elevated troponin, CAD s/p NSTEMI, s/p CABG   - Trend troponin  - Start on Lovenox 125 mg BID   - Continue with ASA 81 mg qd  - f/u lipid panel, TSH, FT4, HBA1C in AM   - Obtain TTE  - To consider CCTA if Cr improves     #EUGENE  - Cr 1.7 on admission, (11/2021 Cr 1.61)   - trend BMP daily     #HTN    #HLD    #T2DM  #Neuropathy    #Gout    #MICKIE    #GERD    DVT ppx: on Lovenox  GI ppx: on famotidine   PT: IAT  FULL CODE    Please contact me with any questions or concerns at [phone]. Assessment:  58 year old male with PMHx of CAD s/p NSTEMI, s/p CABG (2014), HTN, HLD, T2DM, MICKIE (non-compliant with CPAP), GERD, gout, and neuropathy who presented to the ED for acute onset of chest pain associated with shortness breath on exertion for 1 day.   Pt admitted to cardiac telemetry for NSTEMI and ischemic work-up.     Problems discussed and associated plan:  #Chest pain  #Elevated troponin, CAD s/p NSTEMI, s/p CABG   Trop 0.32 -> 0.33   - Trend troponin  - Start on Heparin gtt  - Continue with ASA 81 mg qd, Toprol  mg qd   - Increased Simvastatin to 40 mg (from 20 mg) qhs   - f/u lipid panel, TSH, FT4, HBA1C in AM   - Obtain TTE  - To consider CCTA if Cr improves     #Mild LE edema   - continue with lasix 40 mg qd   - obtain b/l LE venous dopplers to r/o DVT     #EUGENE  - Cr 1.7 on admission, (11/2021 Cr 1.61)   - trend BMP daily     #HTN  - continue on amlodipine 5 mg qd     #HLD  - Increased simvastatin to 40 mg (from 20 mg) qhs  - f/u lipid profile in AM     #T2DM  #Neuropathy  - hold metformin  - Initiate ISS  - Monitor FS AC/HS.  - continue on gabapentin 300 mg TID    #Gout  - continue with allopurinol 300 mg qd     #MICKIE   non compliant w/ CPAP  - education about compliance with CPAP offered     #GERD  - c/w famotidine 20 mg BID    DVT ppx: on Lovenox  GI ppx: on famotidine   PT: IAT  FULL CODE    Please contact me with any questions or concerns at [phone]. Assessment:  58 year old male with PMHx of CAD s/p NSTEMI, s/p CABG (2014), HTN, HLD, T2DM, MICKIE (non-compliant with CPAP), GERD, gout, and neuropathy who presented to the ED for acute onset of chest pain associated with shortness breath on exertion for 1 day.   Pt admitted to cardiac telemetry for NSTEMI and ischemic work-up.     Problems discussed and associated plan:  #Chest pain  #Elevated troponin, CAD s/p NSTEMI, s/p CABG   Trop 0.32 -> 0.33   - Trend troponin  - Obtain coags and then Start on Heparin gtt  - Continue with ASA 81 mg qd, Toprol  mg qd   - Increased Simvastatin to 40 mg (from 20 mg) qhs   - f/u lipid panel, TSH, FT4, HBA1C in AM   - Obtain TTE  - To consider CCTA vs LHC if Cr improves     #Mild LE edema   - continue with lasix 40 mg qd   - obtain b/l LE venous dopplers to r/o DVT     #EUGENE  - Cr 1.7 on admission, (11/2021 Cr 1.61)   - trend BMP daily     #HTN  - continue on amlodipine 5 mg qd     #HLD  - Increased simvastatin to 40 mg (from 20 mg) qhs  - f/u lipid profile in AM     #T2DM  #Neuropathy  - hold metformin  - Initiate ISS  - Monitor FS AC/HS.  - continue on gabapentin 300 mg TID    #Gout  - continue with allopurinol 300 mg qd     #MICKIE   non compliant w/ CPAP  - education about compliance with CPAP offered     #GERD  - c/w famotidine 20 mg BID    DVT ppx: on Lovenox  GI ppx: on famotidine   PT: IAT  FULL CODE    Please contact me with any questions or concerns at [phone]. Assessment:  58 year old male with PMHx of CAD s/p NSTEMI, s/p CABG (2014), HTN, HLD, T2DM, MICKIE (non-compliant with CPAP), GERD, gout, and neuropathy who presented to the ED for acute onset of chest pain associated with shortness breath on exertion for 1 day.   Pt admitted to cardiac telemetry for NSTEMI and ischemic work-up.     Problems discussed and associated plan:  #Chest pain  #Elevated troponin, CAD s/p NSTEMI, s/p CABG   Trop 0.32 -> 0.33   - Trend troponin  - Obtain coags and then Start on Heparin gtt  - Continue with ASA 81 mg qd, Toprol  mg qd   - Increased Simvastatin to 40 mg (from 20 mg) qhs   - f/u lipid panel, TSH, FT4, HBA1C in AM   - Obtain TTE  - To consider CCTA vs LHC if Cr improves     #Bilateral LE edema   - continue with lasix 40 mg qd   - obtain b/l LE venous dopplers to r/o DVT     #EUGENE  - Cr 1.7 on admission, (11/2021 Cr 1.61)   - trend BMP daily     #HTN  - continue on amlodipine 5 mg qd     #HLD  - Increased simvastatin to 40 mg (from 20 mg) qhs  - f/u lipid profile in AM     #T2DM  #Neuropathy  - hold metformin  - Initiate ISS  - Monitor FS AC/HS.  - continue on gabapentin 300 mg TID    #Gout  - continue with allopurinol 300 mg qd     #MICKIE   non compliant w/ CPAP  - education about compliance with CPAP offered     #GERD  - c/w famotidine 20 mg BID    DVT ppx: on Lovenox  GI ppx: on famotidine   PT: IAT  FULL CODE    Please contact me with any questions or concerns at [phone].

## 2023-09-03 NOTE — H&P ADULT - NSHPLABSRESULTS_GEN_ALL_CORE
- Telemetry: SR with PVC's     - ECG (9/2/23):  SR w/ PVCs HR 66 bpm, with TWI in inferior lateral leads    - Stress test (2018) in OP office: negative for ischemia     - Radiology:  CXR negative, wet read. awaiting official read     No recent stress test, TTE, cardiac catheterization, CCTA, and cMRI performed:     - Labs:                        13.1   10.60 )-----------( 179      ( 03 Sep 2023 00:35 )             39.2     09-03    141  |  104  |  32<H>  ----------------------------<  213<H>  4.3   |  24  |  1.7<H>    Ca    8.7      03 Sep 2023 00:35    TPro  6.4  /  Alb  4.0  /  TBili  0.4  /  DBili  x   /  AST  49<H>  /  ALT  26  /  AlkPhos  92  09-03    LIVER FUNCTIONS - ( 03 Sep 2023 00:35 )  Alb: 4.0 g/dL / Pro: 6.4 g/dL / ALK PHOS: 92 U/L / ALT: 26 U/L / AST: 49 U/L / GGT: x           Troponin T, Serum: 0.32 ng/mL (09-03 @ 00:35)    CARDIAC MARKERS ( 03 Sep 2023 00:35 )  x     / 0.32 ng/mL / x     / x     / x            Lactate Trend    Urinalysis Basic - ( 03 Sep 2023 00:35 )    Color: x / Appearance: x / SG: x / pH: x  Gluc: 213 mg/dL / Ketone: x  / Bili: x / Urobili: x   Blood: x / Protein: x / Nitrite: x   Leuk Esterase: x / RBC: x / WBC x   Sq Epi: x / Non Sq Epi: x / Bacteria: x - Telemetry: SR with PVC's HR 60-70s     - ECG (9/2/23):  SR w/ PVCs HR 66 bpm, with TWI in inferior lateral leads    - Stress test (2018) in OP office: negative for ischemia     - Radiology:    CXR negative, wet read. awaiting official read     No recent stress test, TTE, cardiac catheterization, CCTA, and cMRI performed:     - Labs:                        13.1   10.60 )-----------( 179      ( 03 Sep 2023 00:35 )             39.2     09-03    141  |  104  |  32<H>  ----------------------------<  213<H>  4.3   |  24  |  1.7<H>    Ca    8.7      03 Sep 2023 00:35    TPro  6.4  /  Alb  4.0  /  TBili  0.4  /  DBili  x   /  AST  49<H>  /  ALT  26  /  AlkPhos  92  09-03    LIVER FUNCTIONS - ( 03 Sep 2023 00:35 )  Alb: 4.0 g/dL / Pro: 6.4 g/dL / ALK PHOS: 92 U/L / ALT: 26 U/L / AST: 49 U/L / GGT: x           Troponin T, Serum: 0.32 ng/mL (09-03 @ 00:35)    CARDIAC MARKERS ( 03 Sep 2023 00:35 )  x     / 0.32 ng/mL / x     / x     / x        Lactate Trend    Urinalysis Basic - ( 03 Sep 2023 00:35 )    Color: x / Appearance: x / SG: x / pH: x  Gluc: 213 mg/dL / Ketone: x  / Bili: x / Urobili: x   Blood: x / Protein: x / Nitrite: x   Leuk Esterase: x / RBC: x / WBC x   Sq Epi: x / Non Sq Epi: x / Bacteria: x

## 2023-09-03 NOTE — ED PROVIDER NOTE - CLINICAL SUMMARY MEDICAL DECISION MAKING FREE TEXT BOX
58-year-old male with past medical history of 'quintuple bypass' 7 years ago, diabetes, gout, high cholesterol, high blood pressure, GERD, neuropathy p/w atraumatic, substernal exertional CP with radiation to L arm and throat since yesterday--yesterday was relieved with rest but today is constant despite rest. No diaphoresis, no vomiting, no nausea, no abd pain, no urinary sxs, no focal weakness/numbness/tingling.     On exam, VSS. Pt well appearing, no acute distress. Heart is RRR, lungs CTA. Abd soft, non tender. 1+ pedal edema bilaterally. No CVA TTP. Normal pulses and sensation throughout, no focal weakness. Normal conjunctiva.     Concerned for ACS, less likely PE or CHF or PTX or PNA.    PLAN: ASA, CXR, EKG, labs, admit

## 2023-09-03 NOTE — H&P ADULT - TIME BILLING
Interviewed and examined patient. Reviewed hospital course, ECG, CXR, tele, lab work, and medications. Discussed plan with patient.

## 2023-09-03 NOTE — ED PROVIDER NOTE - OBJECTIVE STATEMENT
58-year-old male with past medical history of heart surgery 7 years ago, diabetes, gout, high cholesterol, high blood pressure, GERD, neuropathy, presents to the ED due to atraumatic midsternal chest pain that started yesterday today while he was walking.  Patient states that the pain is pressure-like it worsens with walking and improves with resting.  Patient states that pain radiates to the left side and sometimes to the throat.  Patient also endorses mild shortness of breath with exertion.  Patient denies any headache, nausea, vomiting, diaphoresis, abdominal pain, or dysuria at this time.    Allergies: PCN (unkown reaction)

## 2023-09-04 LAB
A1C WITH ESTIMATED AVERAGE GLUCOSE RESULT: 7.5 % — HIGH (ref 4–5.6)
ALBUMIN SERPL ELPH-MCNC: 3.9 G/DL — SIGNIFICANT CHANGE UP (ref 3.5–5.2)
ALP SERPL-CCNC: 100 U/L — SIGNIFICANT CHANGE UP (ref 30–115)
ALT FLD-CCNC: 24 U/L — SIGNIFICANT CHANGE UP (ref 0–41)
ANION GAP SERPL CALC-SCNC: 10 MMOL/L — SIGNIFICANT CHANGE UP (ref 7–14)
APTT BLD: 44.8 SEC — HIGH (ref 27–39.2)
APTT BLD: 49.2 SEC — HIGH (ref 27–39.2)
APTT BLD: 50.3 SEC — HIGH (ref 27–39.2)
AST SERPL-CCNC: 31 U/L — SIGNIFICANT CHANGE UP (ref 0–41)
BILIRUB SERPL-MCNC: 0.9 MG/DL — SIGNIFICANT CHANGE UP (ref 0.2–1.2)
BUN SERPL-MCNC: 19 MG/DL — SIGNIFICANT CHANGE UP (ref 10–20)
CALCIUM SERPL-MCNC: 9 MG/DL — SIGNIFICANT CHANGE UP (ref 8.4–10.5)
CHLORIDE SERPL-SCNC: 105 MMOL/L — SIGNIFICANT CHANGE UP (ref 98–110)
CO2 SERPL-SCNC: 27 MMOL/L — SIGNIFICANT CHANGE UP (ref 17–32)
CREAT SERPL-MCNC: 1.3 MG/DL — SIGNIFICANT CHANGE UP (ref 0.7–1.5)
EGFR: 63 ML/MIN/1.73M2 — SIGNIFICANT CHANGE UP
ESTIMATED AVERAGE GLUCOSE: 169 MG/DL — HIGH (ref 68–114)
GLUCOSE BLDC GLUCOMTR-MCNC: 193 MG/DL — HIGH (ref 70–99)
GLUCOSE BLDC GLUCOMTR-MCNC: 233 MG/DL — HIGH (ref 70–99)
GLUCOSE BLDC GLUCOMTR-MCNC: 272 MG/DL — HIGH (ref 70–99)
GLUCOSE SERPL-MCNC: 173 MG/DL — HIGH (ref 70–99)
HCT VFR BLD CALC: 40.4 % — LOW (ref 42–52)
HCV AB S/CO SERPL IA: 0.04 COI — SIGNIFICANT CHANGE UP
HCV AB SERPL-IMP: SIGNIFICANT CHANGE UP
HGB BLD-MCNC: 13.4 G/DL — LOW (ref 14–18)
INR BLD: 0.99 RATIO — SIGNIFICANT CHANGE UP (ref 0.65–1.3)
MAGNESIUM SERPL-MCNC: 2.1 MG/DL — SIGNIFICANT CHANGE UP (ref 1.8–2.4)
MCHC RBC-ENTMCNC: 30.9 PG — SIGNIFICANT CHANGE UP (ref 27–31)
MCHC RBC-ENTMCNC: 33.2 G/DL — SIGNIFICANT CHANGE UP (ref 32–37)
MCV RBC AUTO: 93.3 FL — SIGNIFICANT CHANGE UP (ref 80–94)
NRBC # BLD: 0 /100 WBCS — SIGNIFICANT CHANGE UP (ref 0–0)
PLATELET # BLD AUTO: 164 K/UL — SIGNIFICANT CHANGE UP (ref 130–400)
PMV BLD: 12.4 FL — HIGH (ref 7.4–10.4)
POTASSIUM SERPL-MCNC: 3.8 MMOL/L — SIGNIFICANT CHANGE UP (ref 3.5–5)
POTASSIUM SERPL-SCNC: 3.8 MMOL/L — SIGNIFICANT CHANGE UP (ref 3.5–5)
PROT SERPL-MCNC: 6.2 G/DL — SIGNIFICANT CHANGE UP (ref 6–8)
PROTHROM AB SERPL-ACNC: 11.3 SEC — SIGNIFICANT CHANGE UP (ref 9.95–12.87)
RBC # BLD: 4.33 M/UL — LOW (ref 4.7–6.1)
RBC # FLD: 13.2 % — SIGNIFICANT CHANGE UP (ref 11.5–14.5)
SODIUM SERPL-SCNC: 142 MMOL/L — SIGNIFICANT CHANGE UP (ref 135–146)
T4 FREE SERPL-MCNC: 1.1 NG/DL — SIGNIFICANT CHANGE UP (ref 0.9–1.8)
TSH SERPL-MCNC: 2.77 UIU/ML — SIGNIFICANT CHANGE UP (ref 0.27–4.2)
WBC # BLD: 9.85 K/UL — SIGNIFICANT CHANGE UP (ref 4.8–10.8)
WBC # FLD AUTO: 9.85 K/UL — SIGNIFICANT CHANGE UP (ref 4.8–10.8)

## 2023-09-04 PROCEDURE — 93010 ELECTROCARDIOGRAM REPORT: CPT

## 2023-09-04 PROCEDURE — 99233 SBSQ HOSP IP/OBS HIGH 50: CPT

## 2023-09-04 PROCEDURE — 93970 EXTREMITY STUDY: CPT | Mod: 26

## 2023-09-04 RX ORDER — HEPARIN SODIUM 5000 [USP'U]/ML
INJECTION INTRAVENOUS; SUBCUTANEOUS
Qty: 25000 | Refills: 0 | Status: DISCONTINUED | OUTPATIENT
Start: 2023-09-04 | End: 2023-09-04

## 2023-09-04 RX ORDER — SODIUM CHLORIDE 9 MG/ML
1000 INJECTION INTRAMUSCULAR; INTRAVENOUS; SUBCUTANEOUS
Refills: 0 | Status: DISCONTINUED | OUTPATIENT
Start: 2023-09-04 | End: 2023-09-05

## 2023-09-04 RX ORDER — HYDRALAZINE HCL 50 MG
10 TABLET ORAL ONCE
Refills: 0 | Status: COMPLETED | OUTPATIENT
Start: 2023-09-04 | End: 2023-09-04

## 2023-09-04 RX ORDER — AMLODIPINE BESYLATE 2.5 MG/1
5 TABLET ORAL ONCE
Refills: 0 | Status: COMPLETED | OUTPATIENT
Start: 2023-09-04 | End: 2023-09-04

## 2023-09-04 RX ORDER — HEPARIN SODIUM 5000 [USP'U]/ML
6000 INJECTION INTRAVENOUS; SUBCUTANEOUS EVERY 6 HOURS
Refills: 0 | Status: DISCONTINUED | OUTPATIENT
Start: 2023-09-04 | End: 2023-09-04

## 2023-09-04 RX ADMIN — ATORVASTATIN CALCIUM 80 MILLIGRAM(S): 80 TABLET, FILM COATED ORAL at 21:34

## 2023-09-04 RX ADMIN — GABAPENTIN 300 MILLIGRAM(S): 400 CAPSULE ORAL at 21:34

## 2023-09-04 RX ADMIN — SODIUM CHLORIDE 100 MILLILITER(S): 9 INJECTION INTRAMUSCULAR; INTRAVENOUS; SUBCUTANEOUS at 13:56

## 2023-09-04 RX ADMIN — Medication 200 MILLIGRAM(S): at 05:13

## 2023-09-04 RX ADMIN — AMLODIPINE BESYLATE 5 MILLIGRAM(S): 2.5 TABLET ORAL at 05:13

## 2023-09-04 RX ADMIN — Medication 2: at 17:52

## 2023-09-04 RX ADMIN — Medication 1: at 22:00

## 2023-09-04 RX ADMIN — Medication 81 MILLIGRAM(S): at 08:34

## 2023-09-04 RX ADMIN — FAMOTIDINE 20 MILLIGRAM(S): 10 INJECTION INTRAVENOUS at 05:13

## 2023-09-04 RX ADMIN — Medication 40 MILLIGRAM(S): at 05:13

## 2023-09-04 RX ADMIN — GABAPENTIN 300 MILLIGRAM(S): 400 CAPSULE ORAL at 05:13

## 2023-09-04 RX ADMIN — CLOPIDOGREL BISULFATE 75 MILLIGRAM(S): 75 TABLET, FILM COATED ORAL at 08:35

## 2023-09-04 RX ADMIN — AMLODIPINE BESYLATE 5 MILLIGRAM(S): 2.5 TABLET ORAL at 13:56

## 2023-09-04 RX ADMIN — Medication 10 MILLIGRAM(S): at 16:29

## 2023-09-04 RX ADMIN — HEPARIN SODIUM 1800 UNIT(S)/HR: 5000 INJECTION INTRAVENOUS; SUBCUTANEOUS at 01:29

## 2023-09-04 RX ADMIN — FAMOTIDINE 20 MILLIGRAM(S): 10 INJECTION INTRAVENOUS at 17:08

## 2023-09-04 NOTE — PROGRESS NOTE ADULT - NS ATTEND AMEND GEN_ALL_CORE FT
I agree with the assessment and plan above, in addition to further documentation below.    Mr Villarreal is a 58yoM with PMhx of CAD s/p CABG in 2014, HTN, HLD, and NIDDM who presented with chest pain radiating to his jaw on Friday-Saturday. Although the pain resolved, he continued to feel fatigued so was convinced to come to ED. Labs and ECG confirm that he is presenting with NSTEMI, likely a late presentation.  -Receiving NSTEMI treatment with ASA/plavix/heparin  -Continue atorvastatin  -Plan for C today  -TTE with mildly reduced EF. Will discharge with entresto and SGLT2 inhibitor if possible  -Continue metoprolol

## 2023-09-04 NOTE — CHART NOTE - NSCHARTNOTEFT_GEN_A_CORE
PREOPERATIVE DAY OF PROCEDURE EVALUATION:  I have personally seen and examined the patient.  I agree with the history and physical which I have reviewed and noted any changes below.  (Signed electronically by __________)  09-04-23 @ 09:00      58 year old male with PMHx of CAD s/p NSTEMI, s/p CABG (LIMA to LAD and Ramus, SANDY to OM1, and reverse SVG to the PDA and PL of RCA 2014), HTN, HLD, T2DM, MICKIE (non-compliant with CPAP), GERD, gout, and neuropathy who presented to the ED for acute onset of chest pain associated with shortness breath on exertion for 1 day.   Patient presents to cardiology department for LHC with possible intervention.    < from: TTE Echo Complete w/o Contrast w/ Doppler (09.03.23 @ 09:00) >    Summary:   1. Mildly decreased global left ventricular systolic function.   2. LV Ejection Fraction by Kevin's Method with a biplane EF of 49 %.   3. Global cardiomyopathy.   4. Right ventricle was not well visualized. Systolic function appeared   mildly reduced on limited views.   5. Normal left atrial size.   6. Normal right atrial size.   7. Mild to moderate mitral valve regurgitation.   8. Mild tricuspid regurgitation.      Cath Bleeding Risk: 0.7%      Prehydration: patient has +1 b/l LE edema    Left janel test: positive     Patient given today's dose of ASA 81mg and Plavix 75mg PO for pre cath PREOPERATIVE DAY OF PROCEDURE EVALUATION:  I have personally seen and examined the patient.  I agree with the history and physical which I have reviewed and noted any changes below.  (Signed electronically by __________)  09-04-23 @ 09:00      58 year old male with PMHx of CAD s/p NSTEMI, s/p CABG (LIMA to LAD and Ramus, SANDY to OM1, and reverse SVG to the PDA and PL of RCA 2014), HTN, HLD, T2DM, MICKIE (non-compliant with CPAP), GERD, gout, and neuropathy who presented to the ED for acute onset of chest pain associated with shortness breath on exertion for 1 day.   Patient presents to cardiology department for LHC with possible intervention.    < from: TTE Echo Complete w/o Contrast w/ Doppler (09.03.23 @ 09:00) >    Summary:   1. Mildly decreased global left ventricular systolic function.   2. LV Ejection Fraction by Kevin's Method with a biplane EF of 49 %.   3. Global cardiomyopathy.   4. Right ventricle was not well visualized. Systolic function appeared   mildly reduced on limited views.   5. Normal left atrial size.   6. Normal right atrial size.   7. Mild to moderate mitral valve regurgitation.   8. Mild tricuspid regurgitation.      Cath Bleeding Risk: 0.7%      Prehydration: patient has +1 b/l LE edema, on PO lasix daily, no pre bolus     Left janel test: positive     Patient given today's dose of ASA 81mg and Plavix 75mg PO for pre cath

## 2023-09-04 NOTE — PROGRESS NOTE ADULT - ASSESSMENT
Assessment:  58 year old male with PMHx of CAD s/p NSTEMI, s/p CABG (LIMA to LAD and Ramus, SANDY to OM1, and reverse SVG to the PDA and PL of RCA 2014), HTN, HLD, T2DM, MICKIE (non-compliant with CPAP), GERD, gout, and neuropathy who presented to the ED for acute onset of chest pain associated with shortness breath on exertion for 1 day.   Pt admitted to cardiac telemetry for NSTEMI and ischemic work-up.     Problems discussed and associated plan:  #Chest pain  #Elevated troponin, CAD s/p NSTEMI, s/p CABG   Trop 0.32 -> 0.33 ->>0.6->0.73  - Trend troponin til peak  - on Heparin gtt  - Continue with ASA 81 mg qd, Toprol  mg qd   - c/w atorvastatin 80 mg QD  - LDL 88   - TTE EF 49% with global cardiomyopathy  - kept NPO at midnight for possible LHC today     #Bilateral LE edema   - continue with lasix 40 mg qd   - f/u b/l LE venous dopplers to r/o DVT     #EUGENE  - Cr 1.7 on admission, (11/2021 Cr 1.61)   - trend BMP daily     #HTN  - continue on amlodipine 5 mg qd     #HLD  - Increased simvastatin to 40 mg (from 20 mg) qhs  - LDL 88    #T2DM  #Neuropathy  - hold metformin  - Initiate ISS  - Monitor FS AC/HS.  - continue on gabapentin 300 mg TID    #Gout  - continue with allopurinol 300 mg qd     #MICKIE   non compliant w/ CPAP  - education about compliance with CPAP offered     #GERD  - c/w famotidine 20 mg BID    DVT ppx: on Lovenox  GI ppx: on famotidine   PT: IAT  FULL CODE      Please contact me with any questions or concerns at [phone].

## 2023-09-04 NOTE — CHART NOTE - NSCHARTNOTEFT_GEN_A_CORE
PROCEDURE:   [X] LHC   [] LVG   [] RHC   [X] Intervention (see below)       PHYSICIAN:  Dr. aPz  FELLOW: Dr. Barrow    Pre-procedure Diagnosis: NSTEMI    PROCEDURE DESCRIPTION: NSTEMI s/p PCI    Consent:    [X] Patient   [] Family Member   []  Used      Anesthesia:   [] General   [X] Sedation   [X] Local     Access & Closure:   [X ] 6  Fr R Radial Artery  -> Vasc Band  [X ] 6  Fr L    Radial Artery  -> TR Band   [ ]   Fr R    L    Femoral Artery -> Manual compression    Perclose    Angioseal  [ ]   Fr R    L   Femoral Vein -> Manual compression  [ ]   Fr R    L    Brachial Vein -> Manual compression    IV Contrast: 280 mL      Intervention: Successful PCI of SVG to RCA graft with balloon angioplasty s/p LINDA x LIEN FRONTIER RX [id number]    Implants:   LIEN FRONTIER RX [id number]    AUC: 8     FINDINGS:     LM: Moderate disease     LAD: Native % occlusion. Supplied by LIMA graft    CX: 100% occlusion   OM1: Supplied by SANDY graft     Ramus: Supplied by LIMA graft     RCA: Native % occlusion   RPDA: Moderate disease     LIMA to LAD: Mild disease  LIMA to RAMUS: Mild disease   SANDY to OM1: Mild disease  SVG to RCA: 95% disease s/p PCI     LVEDP:  mmHg     EF: 49% (Echo 9/3/23)     PA % sat:  RV % sat:  RA % sat:  FA % sat:    RA pressure:  mmHg  RV pressure:  mmHg  PA pressure:  mmHg  PCWP:  mmHg    CVP:  mmHg  CO/CI Dino:   CO/CI Thermodilution:  /  PVR (woods units):   SVR (woods units):      AV gradient:  mmHg  AV area:  cm2    MV gradient:  mmHg  MV area:  cm2     ESTIMATED BLOOD LOSS: < 10 mL      CONDITION:   [X] Good   [] Fair   [] Critical     SPECIMEN REMOVED: N/A     POST-OP DIAGNOSIS:    [X] Triple vessel disease s/p CABG. SVG to RCA graft occluded s/p PCI     PLAN OF CARE:   [] D/C Home Today   [X] Return to In-patient bed   [] Admit for observation   [] Return for Staged Procedure in 4-6 weeks   [] CT Surgery Consult   [X] Medications:   - Continue Home meds  - Continue ASA 81 mg PO QD  - Continue Clopidogrel 75 mg PO QD. Loaded with 300mg  [X] IV Fluids: NS @ 100cc/h x 6 hours  [] EP Consult  [x] Remove Vasc Band/ TR band. Hold manual pressure if signs of hematoma or bleeding over radial access site.  [x] Smoking cessation

## 2023-09-04 NOTE — PROGRESS NOTE ADULT - SUBJECTIVE AND OBJECTIVE BOX
Chief complaint: Patient is a 58y old  Male who presents with a chief complaint of chest pain (03 Sep 2023 02:02)    Interval history:  Admitted to cardiac telemetry yesterday  Troponins increasing 0.3-->0.6 --> 0.73 last night   Loaded on plavix   switched home simvastatin to atorvastatin 80mg   Review of systems: A complete 10-point review of systems was obtained and is negative except as stated in the interval history.    Vitals:  T(F): 97.8, Max: 97.9 (09-04 @ 00:47)  HR: 70 (63 - 70)  BP: 151/76 (138/76 - 151/76)  RR: 18 (18 - 18)  SpO2: 96% (92% - 96%)    Ins & outs:     09-03 @ 07:01  -  09-04 @ 07:00  --------------------------------------------------------  IN: 680 mL / OUT: 0 mL / NET: 680 mL      Weight trend:  Weight (kg): 124.6 (09-03)    Physical exam:  General: No apparent distress  HEENT: Anicteric sclera. Moist mucous membranes. JVD-  Cardiac: Regular rate and rhythm. No murmurs, rubs, or gallops.   Vascular: Symmetric radial pulses. Dorsalis pedis pulses palpable.   Respiratory: Normal effort. Clear to ascultation.   Abdomen: Soft, nontender. Audible bowel sounds.   Extremities: Warm with no edema. No cyanosis or clubbing.   Skin: Warm and dry. No rash.   Neurologic: Grossly normal motor function.   Psychiatric: Oriented to person, place, and time.     Data reviewed:  - Telemetry: SR   - ECG (date 9/3/23):   Ventricular Rate 60 BPM    Atrial Rate 60 BPM    P-R Interval 148 ms    QRS Duration 108 ms    Q-T Interval 438 ms    QTC Calculation(Bazett) 438 ms    P Axis 18 degrees    R Axis 30 degrees    T Axis -88 degrees    Diagnosis Line Normal sinus rhythm  ST & T wave abnormality, consider lateral ischemia  Abnormal ECG    Confirmed by MAGALYS PRESSLEY, CHRISTIANE ([id number]) on 9/3/2023 8:16:13 PM  - TTE (date 9/3/23):   Summary:   1. Mildly decreased global left ventricular systolic function.   2. LV Ejection Fraction by Kevin's Method with a biplane EF of 49 %.   3. Global cardiomyopathy.   4. Right ventricle was not well visualized. Systolic function appeared mildly reduced on limited views.   5. Normalleft atrial size.   6. Normal right atrial size.   7. Mild to moderate mitral valve regurgitation.   8. Mild tricuspid regurgitation.    - Chest x-ray (date 9/3/23):   Impression:  No radiographic evidence of acute cardiopulmonary disease.  - Labs:                        13.4   9.85  )-----------( 164      ( 04 Sep 2023 06:36 )             40.4     09-03    141  |  104  |  32<H>  ----------------------------<  213<H>  4.3   |  24  |  1.7<H>    Ca    8.7      03 Sep 2023 00:35  Mg     2.1     09-03    TPro  6.4  /  Alb  4.0  /  TBili  0.4  /  DBili  x   /  AST  49<H>  /  ALT  26  /  AlkPhos  92  09-03    PT/INR - ( 03 Sep 2023 04:04 )   PT: 10.90 sec;   INR: 0.96 ratio         PTT - ( 04 Sep 2023 06:52 )  PTT:50.3 sec  Troponin T, Serum: 0.73 ng/mL (09-03-23 @ 18:50)  Troponin T, Serum: 0.61 ng/mL (09-03-23 @ 10:51)  Troponin T, Serum: 0.33 ng/mL (09-03-23 @ 02:00)  Troponin T, Serum: 0.32 ng/mL (09-03-23 @ 00:35)        Triglycerides, Serum: 275 mg/dL (09-03-23 @ 04:04)  LDL Cholesterol Calculated: 88 mg/dL (09-03-23 @ 04:04)      Urinalysis Basic - ( 03 Sep 2023 00:35 )    Color: x / Appearance: x / SG: x / pH: x  Gluc: 213 mg/dL / Ketone: x  / Bili: x / Urobili: x   Blood: x / Protein: x / Nitrite: x   Leuk Esterase: x / RBC: x / WBC x   Sq Epi: x / Non Sq Epi: x / Bacteria: x        Medications:  allopurinol 300 milliGRAM(s) Oral daily  amLODIPine   Tablet 5 milliGRAM(s) Oral daily  aspirin enteric coated 81 milliGRAM(s) Oral daily  atorvastatin 80 milliGRAM(s) Oral at bedtime  clopidogrel Tablet 75 milliGRAM(s) Oral daily  dextrose 50% Injectable 25 Gram(s) IV Push once  dextrose 50% Injectable 25 Gram(s) IV Push once  dextrose 50% Injectable 12.5 Gram(s) IV Push once  famotidine    Tablet 20 milliGRAM(s) Oral two times a day  furosemide    Tablet 40 milliGRAM(s) Oral daily  gabapentin 300 milliGRAM(s) Oral three times a day  glucagon  Injectable 1 milliGRAM(s) IntraMuscular once  influenza   Vaccine 0.5 milliLiter(s) IntraMuscular once  insulin lispro (ADMELOG) corrective regimen sliding scale   SubCutaneous three times a day before meals  insulin lispro (ADMELOG) corrective regimen sliding scale   SubCutaneous at bedtime  metoprolol succinate  milliGRAM(s) Oral daily    Drips:  dextrose 5%. 1000 milliLiter(s) (100 mL/Hr) IV Continuous <Continuous>  dextrose 5%. 1000 milliLiter(s) (50 mL/Hr) IV Continuous <Continuous>  heparin  Infusion.  Unit(s)/Hr (10 mL/Hr) IV Continuous <Continuous>    PRN:     Allergies    penicillin G potassium (Unknown)    Intolerances

## 2023-09-04 NOTE — PROGRESS NOTE ADULT - TIME BILLING
Interviewed and examined patient. Reviewed hospital course, ECG, TTE, tele, lab work, and medications. Discussed plan with patient and family members. Coordinated with cath attending.

## 2023-09-05 ENCOUNTER — TRANSCRIPTION ENCOUNTER (OUTPATIENT)
Age: 58
End: 2023-09-05

## 2023-09-05 VITALS
DIASTOLIC BLOOD PRESSURE: 67 MMHG | RESPIRATION RATE: 18 BRPM | OXYGEN SATURATION: 95 % | TEMPERATURE: 97 F | HEART RATE: 56 BPM | SYSTOLIC BLOOD PRESSURE: 132 MMHG

## 2023-09-05 LAB
ANION GAP SERPL CALC-SCNC: 10 MMOL/L — SIGNIFICANT CHANGE UP (ref 7–14)
BUN SERPL-MCNC: 20 MG/DL — SIGNIFICANT CHANGE UP (ref 10–20)
CALCIUM SERPL-MCNC: 9.2 MG/DL — SIGNIFICANT CHANGE UP (ref 8.4–10.5)
CHLORIDE SERPL-SCNC: 104 MMOL/L — SIGNIFICANT CHANGE UP (ref 98–110)
CO2 SERPL-SCNC: 27 MMOL/L — SIGNIFICANT CHANGE UP (ref 17–32)
CREAT SERPL-MCNC: 1.3 MG/DL — SIGNIFICANT CHANGE UP (ref 0.7–1.5)
EGFR: 63 ML/MIN/1.73M2 — SIGNIFICANT CHANGE UP
GLUCOSE BLDC GLUCOMTR-MCNC: 166 MG/DL — HIGH (ref 70–99)
GLUCOSE SERPL-MCNC: 201 MG/DL — HIGH (ref 70–99)
HCT VFR BLD CALC: 43.3 % — SIGNIFICANT CHANGE UP (ref 42–52)
HGB BLD-MCNC: 14.1 G/DL — SIGNIFICANT CHANGE UP (ref 14–18)
MAGNESIUM SERPL-MCNC: 2.7 MG/DL — HIGH (ref 1.8–2.4)
MCHC RBC-ENTMCNC: 30.6 PG — SIGNIFICANT CHANGE UP (ref 27–31)
MCHC RBC-ENTMCNC: 32.6 G/DL — SIGNIFICANT CHANGE UP (ref 32–37)
MCV RBC AUTO: 93.9 FL — SIGNIFICANT CHANGE UP (ref 80–94)
NRBC # BLD: 0 /100 WBCS — SIGNIFICANT CHANGE UP (ref 0–0)
PLATELET # BLD AUTO: 179 K/UL — SIGNIFICANT CHANGE UP (ref 130–400)
PMV BLD: 12 FL — HIGH (ref 7.4–10.4)
POTASSIUM SERPL-MCNC: 4.2 MMOL/L — SIGNIFICANT CHANGE UP (ref 3.5–5)
POTASSIUM SERPL-SCNC: 4.2 MMOL/L — SIGNIFICANT CHANGE UP (ref 3.5–5)
RBC # BLD: 4.61 M/UL — LOW (ref 4.7–6.1)
RBC # FLD: 13.2 % — SIGNIFICANT CHANGE UP (ref 11.5–14.5)
SODIUM SERPL-SCNC: 141 MMOL/L — SIGNIFICANT CHANGE UP (ref 135–146)
WBC # BLD: 9.1 K/UL — SIGNIFICANT CHANGE UP (ref 4.8–10.8)
WBC # FLD AUTO: 9.1 K/UL — SIGNIFICANT CHANGE UP (ref 4.8–10.8)

## 2023-09-05 PROCEDURE — 93010 ELECTROCARDIOGRAM REPORT: CPT

## 2023-09-05 PROCEDURE — 99233 SBSQ HOSP IP/OBS HIGH 50: CPT

## 2023-09-05 RX ORDER — LOSARTAN POTASSIUM 100 MG/1
1 TABLET, FILM COATED ORAL
Qty: 30 | Refills: 0
Start: 2023-09-05 | End: 2023-10-04

## 2023-09-05 RX ORDER — ATORVASTATIN CALCIUM 80 MG/1
1 TABLET, FILM COATED ORAL
Qty: 30 | Refills: 11
Start: 2023-09-05 | End: 2024-08-29

## 2023-09-05 RX ORDER — EMPAGLIFLOZIN 10 MG/1
1 TABLET, FILM COATED ORAL
Qty: 30 | Refills: 0
Start: 2023-09-05 | End: 2023-10-04

## 2023-09-05 RX ORDER — METFORMIN HYDROCHLORIDE 850 MG/1
1 TABLET ORAL
Qty: 30 | Refills: 0
Start: 2023-09-05 | End: 2023-10-04

## 2023-09-05 RX ORDER — METFORMIN HYDROCHLORIDE 850 MG/1
1 TABLET ORAL
Refills: 0 | DISCHARGE

## 2023-09-05 RX ORDER — CLOPIDOGREL BISULFATE 75 MG/1
1 TABLET, FILM COATED ORAL
Qty: 30 | Refills: 11
Start: 2023-09-05 | End: 2024-08-29

## 2023-09-05 RX ORDER — SIMVASTATIN 20 MG/1
1 TABLET, FILM COATED ORAL
Refills: 0 | DISCHARGE

## 2023-09-05 RX ORDER — LOSARTAN POTASSIUM 100 MG/1
25 TABLET, FILM COATED ORAL DAILY
Refills: 0 | Status: DISCONTINUED | OUTPATIENT
Start: 2023-09-05 | End: 2023-09-05

## 2023-09-05 RX ORDER — ATORVASTATIN CALCIUM 80 MG/1
1 TABLET, FILM COATED ORAL
Qty: 0 | Refills: 0 | DISCHARGE
Start: 2023-09-05

## 2023-09-05 RX ORDER — CLOPIDOGREL BISULFATE 75 MG/1
1 TABLET, FILM COATED ORAL
Qty: 0 | Refills: 0 | DISCHARGE
Start: 2023-09-05

## 2023-09-05 RX ADMIN — Medication 40 MILLIGRAM(S): at 06:11

## 2023-09-05 RX ADMIN — GABAPENTIN 300 MILLIGRAM(S): 400 CAPSULE ORAL at 05:39

## 2023-09-05 RX ADMIN — LOSARTAN POTASSIUM 25 MILLIGRAM(S): 100 TABLET, FILM COATED ORAL at 11:37

## 2023-09-05 RX ADMIN — Medication 81 MILLIGRAM(S): at 11:37

## 2023-09-05 RX ADMIN — Medication 200 MILLIGRAM(S): at 05:38

## 2023-09-05 RX ADMIN — AMLODIPINE BESYLATE 5 MILLIGRAM(S): 2.5 TABLET ORAL at 05:39

## 2023-09-05 RX ADMIN — Medication 300 MILLIGRAM(S): at 11:37

## 2023-09-05 RX ADMIN — Medication 1: at 08:50

## 2023-09-05 RX ADMIN — CLOPIDOGREL BISULFATE 75 MILLIGRAM(S): 75 TABLET, FILM COATED ORAL at 11:37

## 2023-09-05 RX ADMIN — FAMOTIDINE 20 MILLIGRAM(S): 10 INJECTION INTRAVENOUS at 05:37

## 2023-09-05 NOTE — CONSULT NOTE ADULT - ASSESSMENT
58 year old male with PMHx of CAD s/p NSTEMI, s/p CABG (2014), HTN, HLD, T2DM, MICKIE (non-compliant with CPAP), GERD, gout, and neuropathy who presented to the ED for acute onset of chest pain associated with shortness breath on exertion for 1 day. Admitted for NSTEMI s/p PCI. Endocrinology consulted for DM2. On SSI    #DM2  #Obesity  - A1c 7.5  - on discharge, cont metformin 1000mg BID  - Note incomplete. Recs to follow 58 year old male with PMHx of CAD s/p NSTEMI, s/p CABG (2014), HTN, HLD, T2DM, MICKIE (non-compliant with CPAP), GERD, gout, and neuropathy who presented to the ED for acute onset of chest pain associated with shortness breath on exertion for 1 day. Admitted for NSTEMI s/p PCI. Endocrinology consulted for DM2. On SSI. Plan for DC today    #DM2  - A1c 7.5  - on discharge, can try metformin 1000mg QD and SGTL2 inhibitor. If unable to be discharged on SGLT2 inhibitor due to insurance, cont metformin 1000mg BID (can restart metformin tomorrow), and will follow up oupatient to start SGTL2 inhibitor,  - f/u with endocrinology outpatient

## 2023-09-05 NOTE — CONSULT NOTE ADULT - ATTENDING COMMENTS
type 2 DM; newly diagnosed CAD.  Recommend home on Jardiance 10 mg qd ad metformin 1000 mg qd-can f/u in office in 1-2 weeks.

## 2023-09-05 NOTE — DISCHARGE NOTE PROVIDER - COLLABORATE WITH
ACP

## 2023-09-05 NOTE — CONSULT NOTE ADULT - SUBJECTIVE AND OBJECTIVE BOX
HPI:  58 year old male with PMHx of CAD s/p NSTEMI, s/p CABG (2014), HTN, HLD, T2DM, MICKIE (non-compliant with CPAP), GERD, gout, and neuropathy who presented to the ED for acute onset of chest pain associated with shortness breath on exertion for 1 day. Pt reports he was walking his GF to a friend's house for dinner when he suddenly felt a midsternal chest pressure, rating 7/10 with mild SOB for a few minutes that was partially relieved with rest. Pt states once he felt okay to walk again, he started to have worsening SOB walking on an incline and having to make frequent stops to catch his breath; which he reports does not usually happen. Pt reports normally he is able to walk 5-6 blocks without feeling short of breath. Pt then reports around early Saturday morning around 2 AM he started to experience a chest pressure like feeling radiating to left shoulder, up to b/l jaw, and left lateral side towards underneath his breast bone. He states he took a dose of his Pepcid 20 mg x1 without relief. He also reports calling an ambulance at the time and said "they only did an ECG and said it was okay but, recommended for me to go to the hospital because they don't have an ECG to compare it to and also to do for further work-up and to get labs done so I decided to come tonight." He reports the chest pressure was still present but not as strong as his initial episode but left him an unsettling feeling that prompted him to the ED. He also mentions he is compliant with his medications. Pt denies fevers/chills, dizziness, N/V, recent travel, and recent illness.       In the ED: VS 97.9F, HR 68, /83, RR 18, O2 sat 97% on room air. ECG showed SR w/ PVCs HR 66 bpm, with TWI in inferior lateral leads. Labs were significant for elevated troponin of 0.32. CXR negative. Pt was given  mg x1.     Of note, patient was last seen at Dr. Keating's office in 2021. Pt went to follow-up appointment with c/o chest pain and LE edema in which he was suppose to undergo stress test to r/o ischemia and LE venous dopplers to r/o dvt however, pt reports he did go for stress test but was not able to follow-up results due to changes in his insurance. Pt reports he did not know he was suppose to undergo LE dopplers. Pt states his legs intermittently become swollen but reports once he takes his lasix and elevate his legs, the swelling improves.   Stress test in 2018 was negative for ischemia.   (03 Sep 2023 02:02)    Admitted for NSTEMI s/p PCI.    Endocrinology consulted for DM2.   Takes metformin 1000mg BID. BMI 38    A1c is 7.5  On admission, BG levels range 160s-270s. Currently only on sliding scale insulin at this visit.       PAST MEDICAL & SURGICAL HISTORY  CAD (coronary artery disease)    NSTEMI (non-ST elevation myocardial infarction)    HTN (hypertension)    HLD (hyperlipidemia)    Diabetes mellitus, type 2    Gout    GERD (gastroesophageal reflux disease)    History of peripheral neuropathy    MICKIE (obstructive sleep apnea)    S/P CABG (coronary artery bypass graft)        FAMILY HISTORY:  FAMILY HISTORY:      SOCIAL HISTORY:  []smoker  []Alcohol  []Drug    ALLERGIES:  penicillin G potassium (Unknown)      MEDICATIONS:  MEDICATIONS  (STANDING):  allopurinol 300 milliGRAM(s) Oral daily  amLODIPine   Tablet 5 milliGRAM(s) Oral daily  aspirin enteric coated 81 milliGRAM(s) Oral daily  atorvastatin 80 milliGRAM(s) Oral at bedtime  clopidogrel Tablet 75 milliGRAM(s) Oral daily  dextrose 5%. 1000 milliLiter(s) (100 mL/Hr) IV Continuous <Continuous>  dextrose 5%. 1000 milliLiter(s) (50 mL/Hr) IV Continuous <Continuous>  dextrose 50% Injectable 25 Gram(s) IV Push once  dextrose 50% Injectable 25 Gram(s) IV Push once  dextrose 50% Injectable 12.5 Gram(s) IV Push once  famotidine    Tablet 20 milliGRAM(s) Oral two times a day  furosemide    Tablet 40 milliGRAM(s) Oral daily  gabapentin 300 milliGRAM(s) Oral three times a day  glucagon  Injectable 1 milliGRAM(s) IntraMuscular once  influenza   Vaccine 0.5 milliLiter(s) IntraMuscular once  insulin lispro (ADMELOG) corrective regimen sliding scale   SubCutaneous three times a day before meals  insulin lispro (ADMELOG) corrective regimen sliding scale   SubCutaneous at bedtime  metoprolol succinate  milliGRAM(s) Oral daily  sodium chloride 0.9%. 1000 milliLiter(s) (100 mL/Hr) IV Continuous <Continuous>    MEDICATIONS  (PRN):  dextrose Oral Gel 15 Gram(s) Oral once PRN Blood Glucose LESS THAN 70 milliGRAM(s)/deciliter      HOME MEDICATIONS:  Home Medications:  allopurinol 300 mg oral tablet: 1 orally once a day (03 Sep 2023 03:44)  amLODIPine 5 mg oral tablet: 1 orally once a day (03 Sep 2023 03:47)  aspirin 81 mg oral capsule: 1 orally once a day (03 Sep 2023 03:46)  buPROPion 100 mg/12 hours (SR) oral tablet, extended release: 1 tablet, extended release orally 2 times a day (03 Sep 2023 03:51)  famotidine 20 mg oral tablet: 1 orally 2 times a day (03 Sep 2023 03:45)  furosemide 40 mg oral tablet: 1 orally once a day (03 Sep 2023 03:46)  gabapentin 300 mg oral tablet: orally 3 times a day (03 Sep 2023 03:46)  metFORMIN 1000 mg oral tablet: 1 orally 2 times a day (03 Sep 2023 03:44)  metoprolol succinate 200 mg oral capsule, extended release: 1 orally once a day (03 Sep 2023 03:45)  simvastatin 20 mg oral tablet: 1 orally once a day (at bedtime) (03 Sep 2023 03:44)      VITALS:   T(F): 97.4 (09-05 @ 07:23), Max: 98.5 (09-04 @ 23:32)  HR: 56 (09-05 @ 07:23) (52 - 70)  BP: 132/67 (09-05 @ 07:23) (132/67 - 195/97)  BP(mean): 93 (09-05 @ 07:23) (90 - 137)  RR: 18 (09-05 @ 07:23) (15 - 20)  SpO2: 95% (09-05 @ 07:23) (91% - 99%)    I&O's Summary    04 Sep 2023 07:01  -  05 Sep 2023 07:00  --------------------------------------------------------  IN: 520 mL / OUT: 100 mL / NET: 420 mL        REVIEW OF SYSTEMS:  CONSTITUTIONAL: No weakness, fevers or chills  EYES: No visual changes  ENT: No vertigo or throat pain   NECK: No pain or stiffness  RESPIRATORY: No cough, wheezing, hemoptysis; No shortness of breath  CARDIOVASCULAR: No chest pain or palpitations  GASTROINTESTINAL: No abdominal or epigastric pain. No nausea, vomiting, or hematemesis; No diarrhea or constipation. No melena or hematochezia.  GENITOURINARY: No Polyuria  NEUROLOGICAL:  No tremors, no Weakness or numbness  SKIN: No itching, no rashes  MSK: no joint pain    PHYSICAL EXAM:  GENERAL: Patient is awake , alert and oriented,  not in acute distress  EYES: No proptosis, no lid lag  NECK: No thyroid enlargement, no palpable nodules , no bruit  LUNGS: Clear to auscultation bilaterally   CARDIOVASCULAR: S1/S2 present, RRR , no murmurs or rubs  ABD: Soft, non-tender, non-distended, +BS  EXT: No DENA  SKIN: No abdominal striae  NEURO: No tremors, DTR 2+    LABS:                        14.1   9.10  )-----------( 179      ( 05 Sep 2023 05:37 )             43.3     09-05    141  |  104  |  20  ----------------------------<  201<H>  4.2   |  27  |  1.3    Ca    9.2      05 Sep 2023 05:37  Mg     2.7     09-05    TPro  6.2  /  Alb  3.9  /  TBili  0.9  /  DBili  x   /  AST  31  /  ALT  24  /  AlkPhos  100  09-04    PT/INR - ( 04 Sep 2023 06:36 )   PT: 11.30 sec;   INR: 0.99 ratio         PTT - ( 04 Sep 2023 06:52 )  PTT:50.3 sec    CARDIAC MARKERS ( 03 Sep 2023 18:50 )  x     / 0.73 ng/mL / x     / x     / x      CARDIAC MARKERS ( 03 Sep 2023 10:51 )  x     / 0.61 ng/mL / x     / x     / x          09-03 Chol 180 LDL -- HDL 37<L> Trig 275<H>  POCT Blood Glucose.: 166 mg/dL (09-05-23 @ 08:05)  POCT Blood Glucose.: 272 mg/dL (09-04-23 @ 21:47)  POCT Blood Glucose.: 233 mg/dL (09-04-23 @ 17:38)  POCT Blood Glucose.: 193 mg/dL (09-04-23 @ 08:16)  POCT Blood Glucose.: 229 mg/dL (09-03-23 @ 20:58)  POCT Blood Glucose.: 181 mg/dL (09-03-23 @ 17:34)  POCT Blood Glucose.: 274 mg/dL (09-03-23 @ 12:04)  POCT Blood Glucose.: 138 mg/dL (09-03-23 @ 08:26)  POCT Blood Glucose.: 140 mg/dL (09-03-23 @ 05:30)    TSH 2.77; Total T3 --; Free T4 --   HPI:  58 year old male with PMHx of CAD s/p NSTEMI, s/p CABG (2014), HTN, HLD, T2DM, MICKIE (non-compliant with CPAP), GERD, gout, and neuropathy who presented to the ED for acute onset of chest pain associated with shortness breath on exertion for 1 day. Pt reports he was walking his GF to a friend's house for dinner when he suddenly felt a midsternal chest pressure, rating 7/10 with mild SOB for a few minutes that was partially relieved with rest. Pt states once he felt okay to walk again, he started to have worsening SOB walking on an incline and having to make frequent stops to catch his breath; which he reports does not usually happen. Pt reports normally he is able to walk 5-6 blocks without feeling short of breath. Pt then reports around early Saturday morning around 2 AM he started to experience a chest pressure like feeling radiating to left shoulder, up to b/l jaw, and left lateral side towards underneath his breast bone. He states he took a dose of his Pepcid 20 mg x1 without relief. He also reports calling an ambulance at the time and said "they only did an ECG and said it was okay but, recommended for me to go to the hospital because they don't have an ECG to compare it to and also to do for further work-up and to get labs done so I decided to come tonight." He reports the chest pressure was still present but not as strong as his initial episode but left him an unsettling feeling that prompted him to the ED. He also mentions he is compliant with his medications. Pt denies fevers/chills, dizziness, N/V, recent travel, and recent illness.       In the ED: VS 97.9F, HR 68, /83, RR 18, O2 sat 97% on room air. ECG showed SR w/ PVCs HR 66 bpm, with TWI in inferior lateral leads. Labs were significant for elevated troponin of 0.32. CXR negative. Pt was given  mg x1.     Of note, patient was last seen at Dr. Keating's office in 2021. Pt went to follow-up appointment with c/o chest pain and LE edema in which he was suppose to undergo stress test to r/o ischemia and LE venous dopplers to r/o dvt however, pt reports he did go for stress test but was not able to follow-up results due to changes in his insurance. Pt reports he did not know he was suppose to undergo LE dopplers. Pt states his legs intermittently become swollen but reports once he takes his lasix and elevate his legs, the swelling improves.   Stress test in 2018 was negative for ischemia.   (03 Sep 2023 02:02)    Admitted for NSTEMI s/p PCI.    Endocrinology consulted for DM2. Has also diabetic neuropathy  Takes metformin 1000mg BID and gabapentin 300mg TID. BMI 38    A1c is 7.5  On admission, BG levels range 160s-270s. Currently only on sliding scale insulin at this visit. Patient does not check his BGs at home. Does not see an endocrinologist.       PAST MEDICAL & SURGICAL HISTORY  CAD (coronary artery disease)    NSTEMI (non-ST elevation myocardial infarction)    HTN (hypertension)    HLD (hyperlipidemia)    Diabetes mellitus, type 2    Gout    GERD (gastroesophageal reflux disease)    History of peripheral neuropathy    MICKIE (obstructive sleep apnea)    S/P CABG (coronary artery bypass graft)        FAMILY HISTORY:  FAMILY HISTORY:      SOCIAL HISTORY:  []smoker  []Alcohol  []Drug    ALLERGIES:  penicillin G potassium (Unknown)      MEDICATIONS:  MEDICATIONS  (STANDING):  allopurinol 300 milliGRAM(s) Oral daily  amLODIPine   Tablet 5 milliGRAM(s) Oral daily  aspirin enteric coated 81 milliGRAM(s) Oral daily  atorvastatin 80 milliGRAM(s) Oral at bedtime  clopidogrel Tablet 75 milliGRAM(s) Oral daily  dextrose 5%. 1000 milliLiter(s) (100 mL/Hr) IV Continuous <Continuous>  dextrose 5%. 1000 milliLiter(s) (50 mL/Hr) IV Continuous <Continuous>  dextrose 50% Injectable 25 Gram(s) IV Push once  dextrose 50% Injectable 25 Gram(s) IV Push once  dextrose 50% Injectable 12.5 Gram(s) IV Push once  famotidine    Tablet 20 milliGRAM(s) Oral two times a day  furosemide    Tablet 40 milliGRAM(s) Oral daily  gabapentin 300 milliGRAM(s) Oral three times a day  glucagon  Injectable 1 milliGRAM(s) IntraMuscular once  influenza   Vaccine 0.5 milliLiter(s) IntraMuscular once  insulin lispro (ADMELOG) corrective regimen sliding scale   SubCutaneous three times a day before meals  insulin lispro (ADMELOG) corrective regimen sliding scale   SubCutaneous at bedtime  metoprolol succinate  milliGRAM(s) Oral daily  sodium chloride 0.9%. 1000 milliLiter(s) (100 mL/Hr) IV Continuous <Continuous>    MEDICATIONS  (PRN):  dextrose Oral Gel 15 Gram(s) Oral once PRN Blood Glucose LESS THAN 70 milliGRAM(s)/deciliter      HOME MEDICATIONS:  Home Medications:  allopurinol 300 mg oral tablet: 1 orally once a day (03 Sep 2023 03:44)  amLODIPine 5 mg oral tablet: 1 orally once a day (03 Sep 2023 03:47)  aspirin 81 mg oral capsule: 1 orally once a day (03 Sep 2023 03:46)  buPROPion 100 mg/12 hours (SR) oral tablet, extended release: 1 tablet, extended release orally 2 times a day (03 Sep 2023 03:51)  famotidine 20 mg oral tablet: 1 orally 2 times a day (03 Sep 2023 03:45)  furosemide 40 mg oral tablet: 1 orally once a day (03 Sep 2023 03:46)  gabapentin 300 mg oral tablet: orally 3 times a day (03 Sep 2023 03:46)  metFORMIN 1000 mg oral tablet: 1 orally 2 times a day (03 Sep 2023 03:44)  metoprolol succinate 200 mg oral capsule, extended release: 1 orally once a day (03 Sep 2023 03:45)  simvastatin 20 mg oral tablet: 1 orally once a day (at bedtime) (03 Sep 2023 03:44)      VITALS:   T(F): 97.4 (09-05 @ 07:23), Max: 98.5 (09-04 @ 23:32)  HR: 56 (09-05 @ 07:23) (52 - 70)  BP: 132/67 (09-05 @ 07:23) (132/67 - 195/97)  BP(mean): 93 (09-05 @ 07:23) (90 - 137)  RR: 18 (09-05 @ 07:23) (15 - 20)  SpO2: 95% (09-05 @ 07:23) (91% - 99%)    I&O's Summary    04 Sep 2023 07:01  -  05 Sep 2023 07:00  --------------------------------------------------------  IN: 520 mL / OUT: 100 mL / NET: 420 mL        REVIEW OF SYSTEMS:  CONSTITUTIONAL: No weakness, fevers or chills  EYES: No visual changes  ENT: No vertigo or throat pain   NECK: No pain or stiffness  RESPIRATORY: No cough, wheezing, hemoptysis; No shortness of breath  CARDIOVASCULAR: No chest pain or palpitations  GASTROINTESTINAL: No abdominal or epigastric pain. No nausea, vomiting, or hematemesis; No diarrhea or constipation. No melena or hematochezia.  GENITOURINARY: No Polyuria  NEUROLOGICAL:  No tremors, no Weakness or numbness  SKIN: No itching, no rashes  MSK: no joint pain    PHYSICAL EXAM:  GENERAL: Patient is awake , alert and oriented,  not in acute distress  EYES: No proptosis, no lid lag  NECK: No thyroid enlargement, no palpable nodules , no bruit  LUNGS: Clear to auscultation bilaterally   CARDIOVASCULAR: S1/S2 present, RRR , no murmurs or rubs  ABD: Soft, non-tender, non-distended, +BS  EXT: No DENA  SKIN: No abdominal striae  NEURO: No tremors, DTR 2+    LABS:                        14.1   9.10  )-----------( 179      ( 05 Sep 2023 05:37 )             43.3     09-05    141  |  104  |  20  ----------------------------<  201<H>  4.2   |  27  |  1.3    Ca    9.2      05 Sep 2023 05:37  Mg     2.7     09-05    TPro  6.2  /  Alb  3.9  /  TBili  0.9  /  DBili  x   /  AST  31  /  ALT  24  /  AlkPhos  100  09-04    PT/INR - ( 04 Sep 2023 06:36 )   PT: 11.30 sec;   INR: 0.99 ratio         PTT - ( 04 Sep 2023 06:52 )  PTT:50.3 sec    CARDIAC MARKERS ( 03 Sep 2023 18:50 )  x     / 0.73 ng/mL / x     / x     / x      CARDIAC MARKERS ( 03 Sep 2023 10:51 )  x     / 0.61 ng/mL / x     / x     / x          09-03 Chol 180 LDL -- HDL 37<L> Trig 275<H>  POCT Blood Glucose.: 166 mg/dL (09-05-23 @ 08:05)  POCT Blood Glucose.: 272 mg/dL (09-04-23 @ 21:47)  POCT Blood Glucose.: 233 mg/dL (09-04-23 @ 17:38)  POCT Blood Glucose.: 193 mg/dL (09-04-23 @ 08:16)  POCT Blood Glucose.: 229 mg/dL (09-03-23 @ 20:58)  POCT Blood Glucose.: 181 mg/dL (09-03-23 @ 17:34)  POCT Blood Glucose.: 274 mg/dL (09-03-23 @ 12:04)  POCT Blood Glucose.: 138 mg/dL (09-03-23 @ 08:26)  POCT Blood Glucose.: 140 mg/dL (09-03-23 @ 05:30)    TSH 2.77; Total T3 --; Free T4 --   HPI:  58 year old male with PMHx of CAD s/p NSTEMI, s/p CABG (2014), HTN, HLD, T2DM, MICKIE (non-compliant with CPAP), GERD, gout, and neuropathy who presented to the ED for acute onset of chest pain associated with shortness breath on exertion for 1 day. Pt reports he was walking his GF to a friend's house for dinner when he suddenly felt a midsternal chest pressure, rating 7/10 with mild SOB for a few minutes that was partially relieved with rest. Pt states once he felt okay to walk again, he started to have worsening SOB walking on an incline and having to make frequent stops to catch his breath; which he reports does not usually happen. Pt reports normally he is able to walk 5-6 blocks without feeling short of breath. Pt then reports around early Saturday morning around 2 AM he started to experience a chest pressure like feeling radiating to left shoulder, up to b/l jaw, and left lateral side towards underneath his breast bone. He states he took a dose of his Pepcid 20 mg x1 without relief. He also reports calling an ambulance at the time and said "they only did an ECG and said it was okay but, recommended for me to go to the hospital because they don't have an ECG to compare it to and also to do for further work-up and to get labs done so I decided to come tonight." He reports the chest pressure was still present but not as strong as his initial episode but left him an unsettling feeling that prompted him to the ED. He also mentions he is compliant with his medications. Pt denies fevers/chills, dizziness, N/V, recent travel, and recent illness.       In the ED: VS 97.9F, HR 68, /83, RR 18, O2 sat 97% on room air. ECG showed SR w/ PVCs HR 66 bpm, with TWI in inferior lateral leads. Labs were significant for elevated troponin of 0.32. CXR negative. Pt was given  mg x1.     Of note, patient was last seen at Dr. Keating's office in 2021. Pt went to follow-up appointment with c/o chest pain and LE edema in which he was suppose to undergo stress test to r/o ischemia and LE venous dopplers to r/o dvt however, pt reports he did go for stress test but was not able to follow-up results due to changes in his insurance. Pt reports he did not know he was suppose to undergo LE dopplers. Pt states his legs intermittently become swollen but reports once he takes his lasix and elevate his legs, the swelling improves.   Stress test in 2018 was negative for ischemia.   (03 Sep 2023 02:02)    Admitted for NSTEMI s/p PCI.    Endocrinology consulted for DM2. Has also diabetic neuropathy  Takes metformin 1000mg BID and gabapentin 300mg TID. BMI 38  Used to follow up with an endocrinologist    A1c is 7.5  On admission, BG levels range 160s-270s. Currently only on sliding scale insulin at this visit. Patient does not check his BGs at home. Does not see an endocrinologist.       PAST MEDICAL & SURGICAL HISTORY  CAD (coronary artery disease)    NSTEMI (non-ST elevation myocardial infarction)    HTN (hypertension)    HLD (hyperlipidemia)    Diabetes mellitus, type 2    Gout    GERD (gastroesophageal reflux disease)    History of peripheral neuropathy    MICKIE (obstructive sleep apnea)    S/P CABG (coronary artery bypass graft)        FAMILY HISTORY:  FAMILY HISTORY:      SOCIAL HISTORY:  []smoker  []Alcohol  []Drug    ALLERGIES:  penicillin G potassium (Unknown)      MEDICATIONS:  MEDICATIONS  (STANDING):  allopurinol 300 milliGRAM(s) Oral daily  amLODIPine   Tablet 5 milliGRAM(s) Oral daily  aspirin enteric coated 81 milliGRAM(s) Oral daily  atorvastatin 80 milliGRAM(s) Oral at bedtime  clopidogrel Tablet 75 milliGRAM(s) Oral daily  dextrose 5%. 1000 milliLiter(s) (100 mL/Hr) IV Continuous <Continuous>  dextrose 5%. 1000 milliLiter(s) (50 mL/Hr) IV Continuous <Continuous>  dextrose 50% Injectable 25 Gram(s) IV Push once  dextrose 50% Injectable 25 Gram(s) IV Push once  dextrose 50% Injectable 12.5 Gram(s) IV Push once  famotidine    Tablet 20 milliGRAM(s) Oral two times a day  furosemide    Tablet 40 milliGRAM(s) Oral daily  gabapentin 300 milliGRAM(s) Oral three times a day  glucagon  Injectable 1 milliGRAM(s) IntraMuscular once  influenza   Vaccine 0.5 milliLiter(s) IntraMuscular once  insulin lispro (ADMELOG) corrective regimen sliding scale   SubCutaneous three times a day before meals  insulin lispro (ADMELOG) corrective regimen sliding scale   SubCutaneous at bedtime  metoprolol succinate  milliGRAM(s) Oral daily  sodium chloride 0.9%. 1000 milliLiter(s) (100 mL/Hr) IV Continuous <Continuous>    MEDICATIONS  (PRN):  dextrose Oral Gel 15 Gram(s) Oral once PRN Blood Glucose LESS THAN 70 milliGRAM(s)/deciliter      HOME MEDICATIONS:  Home Medications:  allopurinol 300 mg oral tablet: 1 orally once a day (03 Sep 2023 03:44)  amLODIPine 5 mg oral tablet: 1 orally once a day (03 Sep 2023 03:47)  aspirin 81 mg oral capsule: 1 orally once a day (03 Sep 2023 03:46)  buPROPion 100 mg/12 hours (SR) oral tablet, extended release: 1 tablet, extended release orally 2 times a day (03 Sep 2023 03:51)  famotidine 20 mg oral tablet: 1 orally 2 times a day (03 Sep 2023 03:45)  furosemide 40 mg oral tablet: 1 orally once a day (03 Sep 2023 03:46)  gabapentin 300 mg oral tablet: orally 3 times a day (03 Sep 2023 03:46)  metFORMIN 1000 mg oral tablet: 1 orally 2 times a day (03 Sep 2023 03:44)  metoprolol succinate 200 mg oral capsule, extended release: 1 orally once a day (03 Sep 2023 03:45)  simvastatin 20 mg oral tablet: 1 orally once a day (at bedtime) (03 Sep 2023 03:44)      VITALS:   T(F): 97.4 (09-05 @ 07:23), Max: 98.5 (09-04 @ 23:32)  HR: 56 (09-05 @ 07:23) (52 - 70)  BP: 132/67 (09-05 @ 07:23) (132/67 - 195/97)  BP(mean): 93 (09-05 @ 07:23) (90 - 137)  RR: 18 (09-05 @ 07:23) (15 - 20)  SpO2: 95% (09-05 @ 07:23) (91% - 99%)    I&O's Summary    04 Sep 2023 07:01  -  05 Sep 2023 07:00  --------------------------------------------------------  IN: 520 mL / OUT: 100 mL / NET: 420 mL        REVIEW OF SYSTEMS:  CONSTITUTIONAL: No weakness, fevers or chills  EYES: No visual changes  ENT: No vertigo or throat pain   NECK: No pain or stiffness  RESPIRATORY: No cough, wheezing, hemoptysis; No shortness of breath  CARDIOVASCULAR: No chest pain or palpitations  GASTROINTESTINAL: No abdominal or epigastric pain. No nausea, vomiting, or hematemesis; No diarrhea or constipation. No melena or hematochezia.  GENITOURINARY: No Polyuria  NEUROLOGICAL:  No tremors, no Weakness or numbness  SKIN: No itching, no rashes  MSK: no joint pain    PHYSICAL EXAM:  GENERAL: Patient is awake , alert and oriented,  not in acute distress  EYES: No proptosis, no lid lag  NECK: No thyroid enlargement, no palpable nodules , no bruit  LUNGS: Clear to auscultation bilaterally   CARDIOVASCULAR: S1/S2 present, RRR , no murmurs or rubs  ABD: Soft, non-tender, non-distended, +BS  EXT: No DENA  SKIN: No abdominal striae  NEURO: No tremors, DTR 2+    LABS:                        14.1   9.10  )-----------( 179      ( 05 Sep 2023 05:37 )             43.3     09-05    141  |  104  |  20  ----------------------------<  201<H>  4.2   |  27  |  1.3    Ca    9.2      05 Sep 2023 05:37  Mg     2.7     09-05    TPro  6.2  /  Alb  3.9  /  TBili  0.9  /  DBili  x   /  AST  31  /  ALT  24  /  AlkPhos  100  09-04    PT/INR - ( 04 Sep 2023 06:36 )   PT: 11.30 sec;   INR: 0.99 ratio         PTT - ( 04 Sep 2023 06:52 )  PTT:50.3 sec    CARDIAC MARKERS ( 03 Sep 2023 18:50 )  x     / 0.73 ng/mL / x     / x     / x      CARDIAC MARKERS ( 03 Sep 2023 10:51 )  x     / 0.61 ng/mL / x     / x     / x          09-03 Chol 180 LDL -- HDL 37<L> Trig 275<H>  POCT Blood Glucose.: 166 mg/dL (09-05-23 @ 08:05)  POCT Blood Glucose.: 272 mg/dL (09-04-23 @ 21:47)  POCT Blood Glucose.: 233 mg/dL (09-04-23 @ 17:38)  POCT Blood Glucose.: 193 mg/dL (09-04-23 @ 08:16)  POCT Blood Glucose.: 229 mg/dL (09-03-23 @ 20:58)  POCT Blood Glucose.: 181 mg/dL (09-03-23 @ 17:34)  POCT Blood Glucose.: 274 mg/dL (09-03-23 @ 12:04)  POCT Blood Glucose.: 138 mg/dL (09-03-23 @ 08:26)  POCT Blood Glucose.: 140 mg/dL (09-03-23 @ 05:30)    TSH 2.77; Total T3 --; Free T4 --

## 2023-09-05 NOTE — DISCHARGE NOTE PROVIDER - NSDCCPTREATMENT_GEN_ALL_CORE_FT
PRINCIPAL PROCEDURE  Procedure: Left heart cardiac cath  Findings and Treatment: Discharge Instructions as follows:  - Continue medical regimen as prescribed to prevent chest pain.  - Continue dual anti-platelet therapy, beta blocker, Statin.   - Instructed to call 911 if chest pain, shortness of breath or bleeding from access site.  - No heavy lifting > 10lbs x 1 week.  - No driving x 24 hours.  - No baths, swimming pools x 1 week; may shower.  - Follow-up with cardiologist in 1 week.     PRINCIPAL PROCEDURE  Procedure: Left heart cardiac cath  Findings and Treatment: Discharge Instructions as follows:  Hold Metformin until 9/7/23 in AM  Start Plavix 75mg PO Daily  Cont [id number] PO Daily  Stop Simvastatin  Start Lipitor 80mg PO QHS  Start Losartan 25mg PO   Cont Toprol XL 200mg PO Daily  - Continue medical regimen as prescribed to prevent chest pain.  - Continue dual anti-platelet therapy, beta blocker, Statin.   - Instructed to call 911 if chest pain, shortness of breath or bleeding from access site.  - No heavy lifting > 10lbs x 1 week.  - No driving x 24 hours.  - No baths, swimming pools x 1 week; may shower.  - Follow-up with cardiologist in 1 week.     PRINCIPAL PROCEDURE  Procedure: Left heart cardiac cath  Findings and Treatment: Discharge Instructions as follows:  Hold Metformin until 9/7/23 in AM and decrease metformin to 1000mg PO Daily  Start Jardiance 10mg PO Daily  Start Plavix 75mg PO Daily  Cont [id number] PO Daily  Stop Simvastatin  Start Lipitor 80mg PO QHS  Start Losartan 25mg PO   Cont Toprol XL 200mg PO Daily  - Continue medical regimen as prescribed to prevent chest pain.  - Continue dual anti-platelet therapy, beta blocker, Statin.   - Instructed to call 911 if chest pain, shortness of breath or bleeding from access site.  - No heavy lifting > 10lbs x 1 week.  - No driving x 24 hours.  - No baths, swimming pools x 1 week; may shower.  - Follow-up with cardiologist in 1 week.

## 2023-09-05 NOTE — DISCHARGE NOTE NURSING/CASE MANAGEMENT/SOCIAL WORK - PATIENT PORTAL LINK FT
You can access the FollowMyHealth Patient Portal offered by [facility] by registering at the following website: [email or web address]. By joining [employer]s FollowMyHealth portal, you will also be able to view your health information using other applications (apps) compatible with our system.

## 2023-09-05 NOTE — DISCHARGE NOTE PROVIDER - CARE PROVIDER_API CALL
Tarun Keating  Interventional Cardiology  [address] [place] [address], [address]  [place] [address]  Phone: [phone]  Fax: [phone]  Follow Up Time: 1 month

## 2023-09-05 NOTE — DISCHARGE NOTE PROVIDER - ATTENDING DISCHARGE PHYSICAL EXAMINATION:
Physical Exam  T(C): 36.3 (09-05-23 @ 07:23), Max: 36.9 (09-04-23 @ 23:32)  HR: 56 (09-05-23 @ 07:23) (52 - 66)  BP: 132/67 (09-05-23 @ 07:23) (132/67 - 195/97)  RR: 18 (09-05-23 @ 07:23) (15 - 20)  SpO2: 95% (09-05-23 @ 07:23) (94% - 99%)  Gen: NAD  CV: RRR, nl S1 and S2, no m/r/g, no LE edema, no JVD  Pulm: CTAB, no crackles  GI: soft, nontender  MSK: normal ROM  Extremities: warm  Neuro: A+Ox3  Psych: cooperative

## 2023-09-05 NOTE — DISCHARGE NOTE PROVIDER - DISCHARGING ATTENDING PHYSICIAN:
[FreeTextEntry1] : 40 year old female with a history of hypothyroid, benign thyroid nodules, gestational diabetes, referred to hematology for evaluation of lymphocytosis seen on labs Oct and Nov 2020, ALC 4.02>>4.08:\par \par Mild leukocytosis/lymphocytosis- reactive.  Her peripheral blood flow cytometry was nondiagnostic, Jak2 is not mutated, normal FISH bcr-abl.  \par Her WBC today is 15.92, neutrophil predominant.  \par \par Iron deficiency anemia.  \par Hg has not responded to oral iron supplementation. \par She was given 2 doses of IV feraheme on 4/29/21 and 5/6/21.  Her H/H has improved- 13.7/41.6.  \par Repeat her iron studies today.  \par GI evaluation given h/o GI hamartomas (father) who required surgery. \par She will have her labs checked periodically.  \par She will follow up in 4-6 months, or earlier if her symptoms return. \par Discussed that I am leaving the practice, she will follow up with one of my colleagues. \par  Ab Bolanos MD

## 2023-09-05 NOTE — DISCHARGE NOTE PROVIDER - NSDCMRMEDTOKEN_GEN_ALL_CORE_FT
allopurinol 300 mg oral tablet: 1 orally once a day  amLODIPine 5 mg oral tablet: 1 orally once a day  aspirin 81 mg oral capsule: 1 orally once a day  buPROPion 100 mg/12 hours (SR) oral tablet, extended release: 1 tablet, extended release orally 2 times a day  famotidine 20 mg oral tablet: 1 orally 2 times a day  furosemide 40 mg oral tablet: 1 orally once a day  gabapentin 300 mg oral tablet: orally 3 times a day  metFORMIN 1000 mg oral tablet: 1 orally 2 times a day  metoprolol succinate 200 mg oral capsule, extended release: 1 orally once a day  simvastatin 20 mg oral tablet: 1 orally once a day (at bedtime)   allopurinol 300 mg oral tablet: 1 orally once a day  amLODIPine 5 mg oral tablet: 1 orally once a day  aspirin 81 mg oral capsule: 1 orally once a day  atorvastatin 80 mg oral tablet: 1 tab(s) orally once a day (at bedtime)  buPROPion 100 mg/12 hours (SR) oral tablet, extended release: 1 tablet, extended release orally 2 times a day  clopidogrel 75 mg oral tablet: 1 tab(s) orally once a day  famotidine 20 mg oral tablet: 1 orally 2 times a day  furosemide 40 mg oral tablet: 1 orally once a day  gabapentin 300 mg oral tablet: orally 3 times a day  losartan 25 mg oral tablet: 1 tab(s) orally once a day  metFORMIN 1000 mg oral tablet: 1 orally 2 times a day  metoprolol succinate 200 mg oral capsule, extended release: 1 orally once a day   allopurinol 300 mg oral tablet: 1 orally once a day  amLODIPine 5 mg oral tablet: 1 orally once a day  aspirin 81 mg oral capsule: 1 orally once a day  atorvastatin 80 mg oral tablet: 1 tab(s) orally once a day (at bedtime)  buPROPion 100 mg/12 hours (SR) oral tablet, extended release: 1 tablet, extended release orally 2 times a day  clopidogrel 75 mg oral tablet: 1 tab(s) orally once a day  famotidine 20 mg oral tablet: 1 orally 2 times a day  furosemide 40 mg oral tablet: 1 orally once a day  gabapentin 300 mg oral tablet: orally 3 times a day  Jardiance 10 mg oral tablet: 1 tab(s) orally once a day  losartan 25 mg oral tablet: 1 tab(s) orally once a day  metFORMIN 1000 mg oral tablet: 1 tab(s) orally  metoprolol succinate 200 mg oral capsule, extended release: 1 orally once a day

## 2023-09-05 NOTE — DISCHARGE NOTE PROVIDER - HOSPITAL COURSE
Patient is a 58y Male with PMHx of CAD s/p STEMI, s/p CABG (2014), HTN, HLD, T2DM, IMCKIE, non-compliant with CPAP, GERD, gout and neuropathy who presented to [facility] for acute onset of chest pain associated with SOB x 1 day. Patient found to have elevated troponins 0.3, 0.6, 0.73, patient loaded with plavix. On 9/4 patient undernoted LHC which revealed: LM- moderate disease  LAD- Native % occlusion. Supplied by LIMA graft. LCX-100% occlusion, RCA- Native % occlusion. RPDA: Moderate disease, LIMA to LAD: mild disease, LIMA to RAMUS: Mild disease, SANDY to OM1: Mild disease, SVG to RCA: 95% disease s/p PCI with balloon angioplasty s/p LINDA x LIEN [place] RX 5x30mm. Patient found to have TVD s/p CABG. SVG to RCA graft occluded sp PCI. Patient was monitored overnight. On POD 1 patient remains HD stable with no complaints. Patient remains in SR with no arrhythmias noted on tele. EKG performed showed no acute ST changes. Examination of right and left radial artery showed a C/DI site with no hematoma, erythema or bruit. Distal pulses are 2+ bilaterally. Renal function remains stable post cath. Patient will be discharged home on DAPT with ASA and Plavix.  Patient is being DC home in stable condition. Patient is a 58y Male with PMHx of CAD s/p STEMI, s/p CABG (2014), HTN, HLD, T2DM, MICKIE, non-compliant with CPAP, GERD, gout and neuropathy who presented to [facility] for acute onset of chest pain associated with SOB x 1 day. Patient found to have elevated troponin which peaked at 0.73. He was loaded with ASA and Plavix and scheduled for LHC. On 9/4 patient underwent LHC which revealed: LM- moderate disease  LAD- Native % occlusion. Supplied by patent LIMA graft. LCX-100% occlusion, RCA- Native % occlusion. RPDA: Moderate disease, LIMA to LAD: mild disease, SANDY to OM1: Mild disease, SVG to RCA: 95% disease s/p PCI with balloon angioplasty s/p LINDA x LIEN [place] RX 5x30mm. Patient was monitored overnight. TTE was performed 9/4/23 and showed LVEF 49% with mild-mod MR. On POD 1 patient remains HD stable with no complaints. Patient remains in SR with no arrhythmias noted on tele. EKG performed showed no acute ST changes. Examination of right and left radial artery showed a C/DI site with no hematoma, erythema or bruit. Distal pulses are 2+ bilaterally. Renal function remains stable post cath. Patient will be discharged home on DAPT with ASA and Plavix. Toprol XL 200mg PO Daily will be continued. His STATIN will be stopped and he will be started on High Intensity STATIN of Lipitor 80mg PO QHS. He will be discharged home on Losartan 25mg PO Daily.  Patient is being DC home in stable condition. Patient is a 58y Male with PMHx of CAD s/p STEMI, s/p CABG (2014), HTN, HLD, T2DM, MICKIE, non-compliant with CPAP, GERD, gout and neuropathy who presented to [facility] for acute onset of chest pain associated with SOB x 1 day. Patient found to have elevated troponin which peaked at 0.73. He was loaded with ASA and Plavix and scheduled for LHC. On 9/4 patient underwent LHC which revealed: LM- moderate disease  LAD- Native % occlusion. Supplied by patent LIMA graft. LCX-100% occlusion, RCA- Native % occlusion. RPDA: Moderate disease, LIMA to LAD: mild disease, SANDY to OM1: Mild disease, SVG to RCA: 95% disease s/p PCI with balloon angioplasty s/p LINDA x LIEN [place] RX 5x30mm. Patient was monitored overnight. TTE was performed 9/4/23 and showed LVEF 49% with mild-mod MR. On POD 1 patient remains HD stable with no complaints. Patient remains in SR with no arrhythmias noted on tele. EKG performed showed no acute ST changes. Examination of right and left radial artery showed a C/DI site with no hematoma, erythema or bruit. Distal pulses are 2+ bilaterally. Renal function remains stable post cath. Patient will be discharged home on DAPT with ASA and Plavix. Toprol XL 200mg PO Daily will be continued. His STATIN will be stopped and he will be started on High Intensity STATIN of Lipitor 80mg PO QHS. Patient will stop Metformin and Start Jardiance 10mg PO Daily. He will be discharged home on Losartan 25mg PO Daily.  Patient is being DC home in stable condition.

## 2023-09-06 PROBLEM — G47.33 OBSTRUCTIVE SLEEP APNEA (ADULT) (PEDIATRIC): Chronic | Status: ACTIVE | Noted: 2023-09-03

## 2023-09-06 PROBLEM — I25.10 ATHEROSCLEROTIC HEART DISEASE OF NATIVE CORONARY ARTERY WITHOUT ANGINA PECTORIS: Chronic | Status: ACTIVE | Noted: 2023-09-03

## 2023-09-06 PROBLEM — Z86.69 PERSONAL HISTORY OF OTHER DISEASES OF THE NERVOUS SYSTEM AND SENSE ORGANS: Chronic | Status: ACTIVE | Noted: 2023-09-03

## 2023-09-06 PROBLEM — M10.9 GOUT, UNSPECIFIED: Chronic | Status: ACTIVE | Noted: 2023-09-03

## 2023-09-06 PROBLEM — E78.5 HYPERLIPIDEMIA, UNSPECIFIED: Chronic | Status: ACTIVE | Noted: 2023-09-03

## 2023-09-06 PROBLEM — I10 ESSENTIAL (PRIMARY) HYPERTENSION: Chronic | Status: ACTIVE | Noted: 2023-09-03

## 2023-09-06 PROBLEM — E11.9 TYPE 2 DIABETES MELLITUS WITHOUT COMPLICATIONS: Chronic | Status: ACTIVE | Noted: 2023-09-03

## 2023-09-06 PROBLEM — I21.4 NON-ST ELEVATION (NSTEMI) MYOCARDIAL INFARCTION: Chronic | Status: ACTIVE | Noted: 2023-09-03

## 2023-09-06 PROBLEM — K21.9 GASTRO-ESOPHAGEAL REFLUX DISEASE WITHOUT ESOPHAGITIS: Chronic | Status: ACTIVE | Noted: 2023-09-03

## 2023-09-07 ENCOUNTER — EMERGENCY (EMERGENCY)
Facility: HOSPITAL | Age: 58
LOS: 0 days | Discharge: HOME | End: 2023-09-07
Attending: STUDENT IN AN ORGANIZED HEALTH CARE EDUCATION/TRAINING PROGRAM
Payer: MEDICAID

## 2023-09-07 VITALS
OXYGEN SATURATION: 95 % | SYSTOLIC BLOOD PRESSURE: 143 MMHG | TEMPERATURE: 98 F | WEIGHT: 274.92 LBS | HEIGHT: 71 IN | RESPIRATION RATE: 18 BRPM | DIASTOLIC BLOOD PRESSURE: 84 MMHG | HEART RATE: 60 BPM

## 2023-09-07 VITALS — HEART RATE: 59 BPM | RESPIRATION RATE: 18 BRPM

## 2023-09-07 DIAGNOSIS — M10.9 GOUT, UNSPECIFIED: ICD-10-CM

## 2023-09-07 DIAGNOSIS — Z79.84 LONG TERM (CURRENT) USE OF ORAL HYPOGLYCEMIC DRUGS: ICD-10-CM

## 2023-09-07 DIAGNOSIS — Z95.5 PRESENCE OF CORONARY ANGIOPLASTY IMPLANT AND GRAFT: ICD-10-CM

## 2023-09-07 DIAGNOSIS — R00.1 BRADYCARDIA, UNSPECIFIED: ICD-10-CM

## 2023-09-07 DIAGNOSIS — G47.33 OBSTRUCTIVE SLEEP APNEA (ADULT) (PEDIATRIC): ICD-10-CM

## 2023-09-07 DIAGNOSIS — I10 ESSENTIAL (PRIMARY) HYPERTENSION: ICD-10-CM

## 2023-09-07 DIAGNOSIS — Z95.1 PRESENCE OF AORTOCORONARY BYPASS GRAFT: Chronic | ICD-10-CM

## 2023-09-07 DIAGNOSIS — E11.9 TYPE 2 DIABETES MELLITUS WITHOUT COMPLICATIONS: ICD-10-CM

## 2023-09-07 DIAGNOSIS — I25.10 ATHEROSCLEROTIC HEART DISEASE OF NATIVE CORONARY ARTERY WITHOUT ANGINA PECTORIS: ICD-10-CM

## 2023-09-07 DIAGNOSIS — Z79.82 LONG TERM (CURRENT) USE OF ASPIRIN: ICD-10-CM

## 2023-09-07 DIAGNOSIS — Z79.02 LONG TERM (CURRENT) USE OF ANTITHROMBOTICS/ANTIPLATELETS: ICD-10-CM

## 2023-09-07 DIAGNOSIS — Z88.0 ALLERGY STATUS TO PENICILLIN: ICD-10-CM

## 2023-09-07 DIAGNOSIS — I30.9 ACUTE PERICARDITIS, UNSPECIFIED: ICD-10-CM

## 2023-09-07 DIAGNOSIS — R07.1 CHEST PAIN ON BREATHING: ICD-10-CM

## 2023-09-07 DIAGNOSIS — E78.5 HYPERLIPIDEMIA, UNSPECIFIED: ICD-10-CM

## 2023-09-07 LAB
ALBUMIN SERPL ELPH-MCNC: 4.1 G/DL — SIGNIFICANT CHANGE UP (ref 3.5–5.2)
ALP SERPL-CCNC: 110 U/L — SIGNIFICANT CHANGE UP (ref 30–115)
ALT FLD-CCNC: 27 U/L — SIGNIFICANT CHANGE UP (ref 0–41)
ANION GAP SERPL CALC-SCNC: 12 MMOL/L — SIGNIFICANT CHANGE UP (ref 7–14)
AST SERPL-CCNC: 22 U/L — SIGNIFICANT CHANGE UP (ref 0–41)
BASOPHILS # BLD AUTO: 0.04 K/UL — SIGNIFICANT CHANGE UP (ref 0–0.2)
BASOPHILS NFR BLD AUTO: 0.5 % — SIGNIFICANT CHANGE UP (ref 0–1)
BILIRUB SERPL-MCNC: 0.8 MG/DL — SIGNIFICANT CHANGE UP (ref 0.2–1.2)
BUN SERPL-MCNC: 29 MG/DL — HIGH (ref 10–20)
CALCIUM SERPL-MCNC: 9 MG/DL — SIGNIFICANT CHANGE UP (ref 8.4–10.4)
CHLORIDE SERPL-SCNC: 104 MMOL/L — SIGNIFICANT CHANGE UP (ref 98–110)
CO2 SERPL-SCNC: 25 MMOL/L — SIGNIFICANT CHANGE UP (ref 17–32)
CREAT SERPL-MCNC: 1.7 MG/DL — HIGH (ref 0.7–1.5)
EGFR: 46 ML/MIN/1.73M2 — LOW
EOSINOPHIL # BLD AUTO: 0.13 K/UL — SIGNIFICANT CHANGE UP (ref 0–0.7)
EOSINOPHIL NFR BLD AUTO: 1.5 % — SIGNIFICANT CHANGE UP (ref 0–8)
GLUCOSE SERPL-MCNC: 214 MG/DL — HIGH (ref 70–99)
HCT VFR BLD CALC: 41 % — LOW (ref 42–52)
HGB BLD-MCNC: 13.5 G/DL — LOW (ref 14–18)
IMM GRANULOCYTES NFR BLD AUTO: 0.5 % — HIGH (ref 0.1–0.3)
LIDOCAIN IGE QN: 58 U/L — SIGNIFICANT CHANGE UP (ref 7–60)
LYMPHOCYTES # BLD AUTO: 2.47 K/UL — SIGNIFICANT CHANGE UP (ref 1.2–3.4)
LYMPHOCYTES # BLD AUTO: 28.3 % — SIGNIFICANT CHANGE UP (ref 20.5–51.1)
MAGNESIUM SERPL-MCNC: 2.2 MG/DL — SIGNIFICANT CHANGE UP (ref 1.8–2.4)
MCHC RBC-ENTMCNC: 30.7 PG — SIGNIFICANT CHANGE UP (ref 27–31)
MCHC RBC-ENTMCNC: 32.9 G/DL — SIGNIFICANT CHANGE UP (ref 32–37)
MCV RBC AUTO: 93.2 FL — SIGNIFICANT CHANGE UP (ref 80–94)
MONOCYTES # BLD AUTO: 0.51 K/UL — SIGNIFICANT CHANGE UP (ref 0.1–0.6)
MONOCYTES NFR BLD AUTO: 5.8 % — SIGNIFICANT CHANGE UP (ref 1.7–9.3)
NEUTROPHILS # BLD AUTO: 5.53 K/UL — SIGNIFICANT CHANGE UP (ref 1.4–6.5)
NEUTROPHILS NFR BLD AUTO: 63.4 % — SIGNIFICANT CHANGE UP (ref 42.2–75.2)
NRBC # BLD: 0 /100 WBCS — SIGNIFICANT CHANGE UP (ref 0–0)
NT-PROBNP SERPL-SCNC: 664 PG/ML — HIGH (ref 0–300)
PLATELET # BLD AUTO: 183 K/UL — SIGNIFICANT CHANGE UP (ref 130–400)
PMV BLD: 12.4 FL — HIGH (ref 7.4–10.4)
POTASSIUM SERPL-MCNC: 4.3 MMOL/L — SIGNIFICANT CHANGE UP (ref 3.5–5)
POTASSIUM SERPL-SCNC: 4.3 MMOL/L — SIGNIFICANT CHANGE UP (ref 3.5–5)
PROT SERPL-MCNC: 6.7 G/DL — SIGNIFICANT CHANGE UP (ref 6–8)
RBC # BLD: 4.4 M/UL — LOW (ref 4.7–6.1)
RBC # FLD: 13.4 % — SIGNIFICANT CHANGE UP (ref 11.5–14.5)
SODIUM SERPL-SCNC: 141 MMOL/L — SIGNIFICANT CHANGE UP (ref 135–146)
TROPONIN T SERPL-MCNC: 1.09 NG/ML — CRITICAL HIGH
TROPONIN T SERPL-MCNC: 1.34 NG/ML — CRITICAL HIGH
WBC # BLD: 8.72 K/UL — SIGNIFICANT CHANGE UP (ref 4.8–10.8)
WBC # FLD AUTO: 8.72 K/UL — SIGNIFICANT CHANGE UP (ref 4.8–10.8)

## 2023-09-07 PROCEDURE — 99285 EMERGENCY DEPT VISIT HI MDM: CPT

## 2023-09-07 PROCEDURE — 83880 ASSAY OF NATRIURETIC PEPTIDE: CPT

## 2023-09-07 PROCEDURE — 80053 COMPREHEN METABOLIC PANEL: CPT

## 2023-09-07 PROCEDURE — 83735 ASSAY OF MAGNESIUM: CPT

## 2023-09-07 PROCEDURE — 93010 ELECTROCARDIOGRAM REPORT: CPT | Mod: 76

## 2023-09-07 PROCEDURE — 99223 1ST HOSP IP/OBS HIGH 75: CPT

## 2023-09-07 PROCEDURE — G0378: CPT

## 2023-09-07 PROCEDURE — 84484 ASSAY OF TROPONIN QUANT: CPT

## 2023-09-07 PROCEDURE — 85025 COMPLETE CBC W/AUTO DIFF WBC: CPT

## 2023-09-07 PROCEDURE — 99284 EMERGENCY DEPT VISIT MOD MDM: CPT

## 2023-09-07 PROCEDURE — 93308 TTE F-UP OR LMTD: CPT

## 2023-09-07 PROCEDURE — 93308 TTE F-UP OR LMTD: CPT | Mod: 26

## 2023-09-07 PROCEDURE — 36415 COLL VENOUS BLD VENIPUNCTURE: CPT

## 2023-09-07 PROCEDURE — 83690 ASSAY OF LIPASE: CPT

## 2023-09-07 PROCEDURE — 71045 X-RAY EXAM CHEST 1 VIEW: CPT | Mod: 26

## 2023-09-07 PROCEDURE — 93010 ELECTROCARDIOGRAM REPORT: CPT | Mod: 77

## 2023-09-07 PROCEDURE — 93005 ELECTROCARDIOGRAM TRACING: CPT

## 2023-09-07 PROCEDURE — 71045 X-RAY EXAM CHEST 1 VIEW: CPT

## 2023-09-07 RX ORDER — ACETAMINOPHEN 500 MG
650 TABLET ORAL ONCE
Refills: 0 | Status: COMPLETED | OUTPATIENT
Start: 2023-09-07 | End: 2023-09-07

## 2023-09-07 RX ORDER — COLCHICINE 0.6 MG
1 TABLET ORAL
Qty: 14 | Refills: 0
Start: 2023-09-07 | End: 2023-09-13

## 2023-09-07 NOTE — CONSULT NOTE ADULT - ATTENDING COMMENTS
59 YO M with PMH of HTN, HLD, DM type II, MICKIE, gout, CAD s/p PCI (STEMI) s/p post CABG with recent PCI to SVG-RCA graft (9/4/23) by Dr. Paz (outpatient cardiologist is Dr. Keating) presents with right sided pleuritic chest pain.    IMPRESSION  - Chest pain; likely emily-infarction pericarditis; no new ischemic changes on EKG. No pericardial friction rub. Echo negative for effusion   - CAD s/p CABG (LIMA to LAD, LIMA to Ramus, SANDY to OM, SVG to RCA and SVG to RPDA) with recent PCI of SVG to RCA graft  - DM type II; HbA1C 7.5 % [09-03-23]   - HTN/HLD    PLAN:    - Start Colchicine 0.6mg PO BID. Continue other home medications  - No further inpatient workup from cardiology. Outpatient follow up with Dr. Keating.

## 2023-09-07 NOTE — ED ADULT TRIAGE NOTE - CHIEF COMPLAINT QUOTE
I had a stent put in on Sunday, I feel a little pain here (right chest wall) when I take deep breaths. I didn't know I was having a heart attack last weekend so I just want to make sure everything was okay

## 2023-09-07 NOTE — ED ADULT NURSE NOTE - OBJECTIVE STATEMENT
I had a stent put in on Sunday, I feel a little pain here (right chest wall) when I take deep breaths. I didn't know I was having a heart attack last weekend so I just want to make sure everything was okay.  Pt c/o right chest pain that comes and goes. Pt denies SOB.

## 2023-09-07 NOTE — ED PROVIDER NOTE - CLINICAL SUMMARY MEDICAL DECISION MAKING FREE TEXT BOX
Pt presented for evaluation of chest pain.  Patient is status post PCI 4 days ago.  Required EKG, labs chest x-ray.  Cardiology consulted.  Patient to be placed in ED OU for further evaluation and management.

## 2023-09-07 NOTE — CONSULT NOTE ADULT - SUBJECTIVE AND OBJECTIVE BOX
Date of Admission: 09-07-23    CHIEF COMPLAINT: Chest Pain    HISTORY OF PRESENT ILLNESS:   57 YO M with PMH of HTN, HLD, DM type II, MICKIE, gout, CAD s/p PCI (STEMI) s/p post CABG with recent PCI to SVG-RCA graft (9/4/23) by Dr. Paz (outpatient cardiologist is Dr. Keating) presents with right sided chest pain.  Patient reports pain is sharp, pleuritic, only present when taking a deep breath, nonradiating, without other modifying relieving factors.  Denies trauma.  Denies fever/chills, cough, SOB, abdominal pain, N/V/D, extremity numbness/weakness/paresthesia/swelling.  States this pain does not feel like prior pain of ACS, but states he was nervous for recurrent so came to ED. Has been taking his aspirin/statin/Plavix as prescribed since his discharge home a few days ago    PAST MEDICAL & SURGICAL HISTORY:  CAD (coronary artery disease)      NSTEMI (non-ST elevation myocardial infarction)      HTN (hypertension)      HLD (hyperlipidemia)      Diabetes mellitus, type 2      Gout      GERD (gastroesophageal reflux disease)      History of peripheral neuropathy      MICKIE (obstructive sleep apnea)      S/P CABG (coronary artery bypass graft)          HEALTH ISSUES - PROBLEM Dx:    	    REVIEW OF SYMPTOMS:  As per HPI      VITALS:  T(C): 36.9 (09-07-23 @ 04:02), Max: 36.9 (09-07-23 @ 04:02)  HR: 59 (09-07-23 @ 07:43) (59 - 60)  BP: 143/84 (09-07-23 @ 04:02) (143/84 - 143/84)  RR: 18 (09-07-23 @ 07:43) (18 - 18)  SpO2: 95% (09-07-23 @ 04:02) (95% - 95%)  Wt(kg): --  I&O's Summary    Daily Height in cm: 180.34 (07 Sep 2023 04:02)    Daily     PHYSICAL EXAM:  GEN: Not in acute distress  HEENT: EOMI  LUNGS: Dec BS   CARDIOVASCULAR: RRR, S1/S2 present, no murmurs, rubs or gallops, no JVD  ABD: Soft, non-tender, non-distended  EXT: No DENA  SKIN: Intact  NEURO: AAOx3    LABS:	 	                          13.5   8.72  )-----------( 183      ( 07 Sep 2023 05:07 )             41.0     09-07    141  |  104  |  29<H>  ----------------------------<  214<H>  4.3   |  25  |  1.7<H>    Ca    9.0      07 Sep 2023 05:07  Mg     2.2     09-07    TPro  6.7  /  Alb  4.1  /  TBili  0.8  /  DBili  x   /  AST  22  /  ALT  27  /  AlkPhos  110  09-07    CARDIAC MARKERS ( 07 Sep 2023 09:09 )  x     / 1.09 ng/mL / x     / x     / x      CARDIAC MARKERS ( 07 Sep 2023 05:07 )  x     / 1.34 ng/mL / x     / x     / x          CARDIAC MARKERS:  Troponin trend: 0.33  (03 Sep 2023 02:00), 0.61  (03 Sep 2023 10:51), 0.73  (03 Sep 2023 18:50), 1.34  (07 Sep 2023 05:07), 1.09  (07 Sep 2023 09:09)    TELEMETRY EVENTS:      ECG:    < from: 12 Lead ECG (09.07.23 @ 10:25) >    Diagnosis Line Sinus bradycardia  ST & T wave abnormality, consider inferior ischemia  ST & T wave abnormality, consider anterolateral ischemia  Abnormal ECG    < end of copied text >  RADIOLOGY:    < from: Xray Chest 1 View- PORTABLE-Urgent (09.07.23 @ 07:12) >  Impression:    No radiographic evidence of acute cardiopulmonary disease.    < end of copied text >  OTHER:    Echocardiogram:    < from: TTE Echo Complete w/o Contrast w/ Doppler (09.03.23 @ 09:00) >   1. Mildly decreased global left ventricular systolic function.   2. LV Ejection Fraction by Kevin's Method with a biplane EF of 49 %.   3. Global cardiomyopathy.   4. Right ventricle was not well visualized. Systolic function appeared   mildly reduced on limited views.   5. Normalleft atrial size.   6. Normal right atrial size.   7. Mild to moderate mitral valve regurgitation.   8. Mild tricuspid regurgitation.    < end of copied text >  Catheterization:    Stress Test:  	  	    Home Medications:  allopurinol 300 mg oral tablet: 1 orally once a day (03 Sep 2023 03:44)  amLODIPine 5 mg oral tablet: 1 orally once a day (03 Sep 2023 03:47)  aspirin 81 mg oral capsule: 1 orally once a day (03 Sep 2023 03:46)  buPROPion 100 mg/12 hours (SR) oral tablet, extended release: 1 tablet, extended release orally 2 times a day (03 Sep 2023 03:51)  famotidine 20 mg oral tablet: 1 orally 2 times a day (03 Sep 2023 03:45)  furosemide 40 mg oral tablet: 1 orally once a day (03 Sep 2023 03:46)  gabapentin 300 mg oral tablet: orally 3 times a day (03 Sep 2023 03:46)  metoprolol succinate 200 mg oral capsule, extended release: 1 orally once a day (03 Sep 2023 03:45)    MEDICATIONS  (STANDING):    MEDICATIONS  (PRN):         Date of Admission: 09-07-23    CHIEF COMPLAINT: Chest Pain    HISTORY OF PRESENT ILLNESS:     57 YO M with PMH of HTN, HLD, DM type II, MICKIE, gout, CAD s/p PCI (STEMI) s/p post CABG with recent PCI to SVG-RCA graft (9/4/23) by Dr. Paz (outpatient cardiologist is Dr. Keating) presents with right sided chest pain.  Patient reports pain is sharp, pleuritic, only present when taking a deep breath, nonradiating, without other modifying relieving factors.  Denies trauma.  Denies fever/chills, cough, SOB, abdominal pain, N/V/D, extremity numbness/weakness/paresthesia/swelling.  States this pain does not feel like prior pain of ACS, but states he was nervous for recurrent so came to ED. Has been taking his aspirin/statin/Plavix as prescribed since his discharge home a few days ago    PAST MEDICAL & SURGICAL HISTORY:  CAD (coronary artery disease)      NSTEMI (non-ST elevation myocardial infarction)      HTN (hypertension)      HLD (hyperlipidemia)      Diabetes mellitus, type 2      Gout      GERD (gastroesophageal reflux disease)      History of peripheral neuropathy      MICKIE (obstructive sleep apnea)      S/P CABG (coronary artery bypass graft)          HEALTH ISSUES - PROBLEM Dx:    	    REVIEW OF SYMPTOMS:  As per HPI      VITALS:  T(C): 36.9 (09-07-23 @ 04:02), Max: 36.9 (09-07-23 @ 04:02)  HR: 59 (09-07-23 @ 07:43) (59 - 60)  BP: 143/84 (09-07-23 @ 04:02) (143/84 - 143/84)  RR: 18 (09-07-23 @ 07:43) (18 - 18)  SpO2: 95% (09-07-23 @ 04:02) (95% - 95%)  Wt(kg): --  I&O's Summary    Daily Height in cm: 180.34 (07 Sep 2023 04:02)    Daily     PHYSICAL EXAM:  GEN: Not in acute distress  HEENT: EOMI  LUNGS: Dec BS   CARDIOVASCULAR: RRR, S1/S2 present, no murmurs, rubs or gallops, no JVD  ABD: Soft, non-tender, non-distended  EXT: No DENA  SKIN: Intact  NEURO: AAOx3    LABS:	 	                          13.5   8.72  )-----------( 183      ( 07 Sep 2023 05:07 )             41.0     09-07    141  |  104  |  29<H>  ----------------------------<  214<H>  4.3   |  25  |  1.7<H>    Ca    9.0      07 Sep 2023 05:07  Mg     2.2     09-07    TPro  6.7  /  Alb  4.1  /  TBili  0.8  /  DBili  x   /  AST  22  /  ALT  27  /  AlkPhos  110  09-07    CARDIAC MARKERS ( 07 Sep 2023 09:09 )  x     / 1.09 ng/mL / x     / x     / x      CARDIAC MARKERS ( 07 Sep 2023 05:07 )  x     / 1.34 ng/mL / x     / x     / x          CARDIAC MARKERS:  Troponin trend: 0.33  (03 Sep 2023 02:00), 0.61  (03 Sep 2023 10:51), 0.73  (03 Sep 2023 18:50), 1.34  (07 Sep 2023 05:07), 1.09  (07 Sep 2023 09:09)    TELEMETRY EVENTS:      ECG:    < from: 12 Lead ECG (09.07.23 @ 10:25) >    Diagnosis Line Sinus bradycardia  ST & T wave abnormality, consider inferior ischemia  ST & T wave abnormality, consider anterolateral ischemia  Abnormal ECG    < end of copied text >  RADIOLOGY:    < from: Xray Chest 1 View- PORTABLE-Urgent (09.07.23 @ 07:12) >  Impression:    No radiographic evidence of acute cardiopulmonary disease.    < end of copied text >  OTHER:    Echocardiogram:    < from: TTE Echo Complete w/o Contrast w/ Doppler (09.03.23 @ 09:00) >   1. Mildly decreased global left ventricular systolic function.   2. LV Ejection Fraction by Kevin's Method with a biplane EF of 49 %.   3. Global cardiomyopathy.   4. Right ventricle was not well visualized. Systolic function appeared   mildly reduced on limited views.   5. Normalleft atrial size.   6. Normal right atrial size.   7. Mild to moderate mitral valve regurgitation.   8. Mild tricuspid regurgitation.    < end of copied text >  Catheterization:    Stress Test:  	  	    Home Medications:  allopurinol 300 mg oral tablet: 1 orally once a day (03 Sep 2023 03:44)  amLODIPine 5 mg oral tablet: 1 orally once a day (03 Sep 2023 03:47)  aspirin 81 mg oral capsule: 1 orally once a day (03 Sep 2023 03:46)  buPROPion 100 mg/12 hours (SR) oral tablet, extended release: 1 tablet, extended release orally 2 times a day (03 Sep 2023 03:51)  famotidine 20 mg oral tablet: 1 orally 2 times a day (03 Sep 2023 03:45)  furosemide 40 mg oral tablet: 1 orally once a day (03 Sep 2023 03:46)  gabapentin 300 mg oral tablet: orally 3 times a day (03 Sep 2023 03:46)  metoprolol succinate 200 mg oral capsule, extended release: 1 orally once a day (03 Sep 2023 03:45)    MEDICATIONS  (STANDING):    MEDICATIONS  (PRN):

## 2023-09-07 NOTE — ED CDU PROVIDER DISPOSITION NOTE - CLINICAL COURSE
Quality 431: Preventive Care And Screening: Unhealthy Alcohol Use - Screening: Patient screened for unhealthy alcohol use using a single question and scores less than 2 times per year
Quality 226: Preventive Care And Screening: Tobacco Use: Screening And Cessation Intervention: Patient screened for tobacco use and is an ex/non-smoker
Quality 130: Documentation Of Current Medications In The Medical Record: Current Medications Documented
Detail Level: Detailed
58-year-old male with PMH of HTN, HLD, DM, MICKIE, gout, CAD status post STEMI in 2014 status post CABG x3, now status post RCA stent 9/4 by Dr. Paz presents back to ED with right-sided chest pain today. Had initial work up in the ED notable for positive troponin without new EKG changes. Pt was admitted to obs unit for further evaluation and management. His repeat EKG was without changes. His 2nd troponin was downtrending--elevated troponin expected in setting of recent cath. He was seen by cardiology team here who recommended echo to rule out pericardial effusion. There was no effusion. Cardiology team feels presentation most c/w pericarditis and recommended dc on colchicine for 1 week with outpatient follow up.

## 2023-09-07 NOTE — ED CDU PROVIDER DISPOSITION NOTE - PATIENT PORTAL LINK FT
You can access the FollowMyHealth Patient Portal offered by [facility] by registering at the following website: [email or web address]. By joining [facility]’s FollowMyHealth portal, you will also be able to view your health information using other applications (apps) compatible with our system.

## 2023-09-07 NOTE — ED PROVIDER NOTE - ATTENDING CONTRIBUTION TO CARE
I personally evaluated the patient. I reviewed the Resident’s or Physician Assistant’s note (as assigned above), and agree with the findings and plan except as documented in my note.  58-year-old male with history of CAD status post CABG, HTN, HLD, DM, MICKIE, GERD, gout, neuropathy status post stent placement about 4 days ago.  Patient is complaining now right-sided pleuritic chest pain.  Denies any associated symptoms.  No fever, coughing, shortness of breath, lower extremity pain or swelling, nausea, vomiting, diarrhea.  VSS, non toxic appearing, NAD, Head NCAT, MMM, neck supple, normal ROM, normal s1s2, no murmurs, no chest wall tenderness to palpation, lungs ctab, abd s/nt/nd, no guarding or rebound, extremities wnl, AAO x 3, GCS 15, neuro grossly normal. No acute skin lesions. Plan is EKG, labs, monitor, cardiology consult and reassess.

## 2023-09-07 NOTE — ED CDU PROVIDER INITIAL DAY NOTE - PROGRESS NOTE DETAILS
Patient comfortable. Awaiting repeat EKG/trop and cards  evaluation. Spoke with cards fellow Dr Barrow, he will come see patient per cards fellow, order limited bedside echo to r/o pericardial effusion Discussed with cardiology attending who says the pt's echo does not show any pericardial effusion. Believes presentation is c/w pericarditis. Recommended dc patient with 1 week of colchicine and outpatient follow up.     Patient to be discharged from ED. Any available test results were discussed with patient and/or family. Verbal instructions given, including instructions to return to ED immediately for any new, worsening, or concerning symptoms. Patient endorsed understanding. Written discharge instructions additionally given, including follow-up plan.

## 2023-09-07 NOTE — ED PROVIDER NOTE - OBJECTIVE STATEMENT
58-year-old male with PMH of HTN, HLD, DM, MICKIE, gout, CAD status post STEMI in 2014 status post CABG x3, now status post RCA stent 9/4 by Dr. Paz presents back to ED with right-sided chest pain today.  Patient reports pain is sharp, pleuritic, only present when taking a deep breath, nonradiating, without other modifying relieving factors.  Denies trauma.  Denies fever/chills, cough, SOB, abdominal pain, N/V/D, extremity numbness/weakness/paresthesia/swelling.  States this pain does not feel like prior pain of ACS, but states he was nervous for recurrent so came to ED.  Has been taking his aspirin/statin/Plavix as prescribed since his discharge home a few days ago

## 2023-09-07 NOTE — ED CDU PROVIDER DISPOSITION NOTE - CARE PROVIDER_API CALL
Tarun Keating  Interventional Cardiology  [address] [place] [address], [address]  [place] [address]  Phone: [phone]  Fax: [phone]  Follow Up Time:

## 2023-09-07 NOTE — ED PROVIDER NOTE - PHYSICAL EXAMINATION
VITAL SIGNS: I have reviewed nursing notes and confirm.  CONSTITUTIONAL: Well-developed; well-nourished; in no acute distress.  SKIN: Skin exam is warm and dry, no acute rash.  HEAD: Normocephalic; atraumatic.  EYES: PERRL, conjunctiva and sclera clear.  ENT: mmm  CARD: S1, S2 normal; no murmurs, gallops, or rubs. Regular rate and rhythm.  RESP: Normal respiratory effort, no tachypnea or distress. Lungs CTAB, no wheezes, rales or rhonchi.  ABD: obese, soft, NT/ND.  EXT: Normal ROM. 1+ pitting edema symmetrically to bilateral lower extremities, no calf TTP  NEURO: Alert, oriented. Grossly unremarkable. No focal deficits.  PSYCH: Cooperative, appropriate.

## 2023-09-07 NOTE — CONSULT NOTE ADULT - ASSESSMENT
**INCOMPLETE NOTE**    57 YO M with PMH of HTN, HLD, DM type II, MICKIE, gout, CAD s/p PCI (STEMI) s/p post CABG with recent PCI to SVG-RCA graft (9/4/23) by Dr. Paz (outpatient cardiologist is Dr. Keating) presents with right sided chest pain.    IMPRESSION  - Chest pain; likely emily-infarction pericarditis; no new ischemic changes on EKG. No pericardial friction rub.   - CAD s/p CABG (LIMA to LAD, LIMA to Ramus, SANDY to OM, SVG to RCA and SVG to RPDA) with recent PCI of SVG to RCA graft  - DM type II; HbA1C 7.5 % [09-03-23]   - HTN/HLD    PLAN  - Limited echo to rule out pericardial effusion  57 YO M with PMH of HTN, HLD, DM type II, MICKIE, gout, CAD s/p PCI (STEMI) s/p post CABG with recent PCI to SVG-RCA graft (9/4/23) by Dr. Paz (outpatient cardiologist is Dr. Keating) presents with right sided chest pain.    IMPRESSION  - Chest pain; likely emily-infarction pericarditis; no new ischemic changes on EKG. No pericardial friction rub. Echo negative for effusion   - CAD s/p CABG (LIMA to LAD, LIMA to Ramus, SANDY to OM, SVG to RCA and SVG to RPDA) with recent PCI of SVG to RCA graft  - DM type II; HbA1C 7.5 % [09-03-23]   - HTN/HLD    PLAN  - Start Colchicine 0.6mg PO BID. Continue other home medications  - No further inpatient workup from cardiology. Outpatient follow up with Dr. Keating.

## 2023-09-15 ENCOUNTER — APPOINTMENT (OUTPATIENT)
Dept: CARDIOLOGY | Facility: CLINIC | Age: 58
End: 2023-09-15

## 2024-02-09 ENCOUNTER — APPOINTMENT (OUTPATIENT)
Dept: CARDIOLOGY | Facility: CLINIC | Age: 59
End: 2024-02-09
Payer: MEDICAID

## 2024-02-09 ENCOUNTER — NON-APPOINTMENT (OUTPATIENT)
Age: 59
End: 2024-02-09

## 2024-02-09 VITALS
WEIGHT: 253 LBS | BODY MASS INDEX: 35.42 KG/M2 | SYSTOLIC BLOOD PRESSURE: 120 MMHG | OXYGEN SATURATION: 96 % | TEMPERATURE: 96 F | RESPIRATION RATE: 16 BRPM | HEIGHT: 71 IN | HEART RATE: 68 BPM | DIASTOLIC BLOOD PRESSURE: 80 MMHG

## 2024-02-09 DIAGNOSIS — E78.2 MIXED HYPERLIPIDEMIA: ICD-10-CM

## 2024-02-09 DIAGNOSIS — E11.9 TYPE 2 DIABETES MELLITUS W/OUT COMPLICATIONS: ICD-10-CM

## 2024-02-09 DIAGNOSIS — I10 ESSENTIAL (PRIMARY) HYPERTENSION: ICD-10-CM

## 2024-02-09 DIAGNOSIS — I25.9 CHRONIC ISCHEMIC HEART DISEASE, UNSPECIFIED: ICD-10-CM

## 2024-02-09 PROCEDURE — 93000 ELECTROCARDIOGRAM COMPLETE: CPT

## 2024-02-09 PROCEDURE — G2211 COMPLEX E/M VISIT ADD ON: CPT | Mod: NC,1L

## 2024-02-09 PROCEDURE — 99214 OFFICE O/P EST MOD 30 MIN: CPT | Mod: 25

## 2024-02-09 RX ORDER — EMPAGLIFLOZIN 10 MG/1
10 TABLET, FILM COATED ORAL DAILY
Refills: 0 | Status: ACTIVE | COMMUNITY

## 2024-02-09 RX ORDER — LOSARTAN POTASSIUM 25 MG/1
25 TABLET, FILM COATED ORAL DAILY
Refills: 0 | Status: ACTIVE | COMMUNITY

## 2024-02-09 RX ORDER — CLOPIDOGREL BISULFATE 75 MG/1
75 TABLET, FILM COATED ORAL DAILY
Refills: 0 | Status: ACTIVE | COMMUNITY

## 2024-02-09 NOTE — ASSESSMENT
[FreeTextEntry1] : CAD, s/p NSTEMI, s/p CABG Obtain stress test to assess for ischemia. Compliance discussed. C/w BP meds and CPAP. Low salt diet, weight loss discussed. ECG noted.  Check labs. Lipids, A1c.  CRI - creatinine 1.78 - likely diabetic nephropathy - recommend f/u with Dr. Kleiner.  Patient advised to continue with medical therapy. Check labs for DM, lipid control. rx given. F/u with pulmonary for MICKIE, nephrology for CRI.  F/u in 1-2 months to re-assess. Secondary prevention discussed. Patient was advised about healthy lifestyle changes, including diet and exercise. Importance of sustained long-term weight loss was discussed, questions answered.

## 2024-02-09 NOTE — PHYSICAL EXAM
[General Appearance - Well Developed] : well developed [General Appearance - Well Nourished] : well nourished [General Appearance - In No Acute Distress] : no acute distress [Normal Conjunctiva] : the conjunctiva exhibited no abnormalities [Normal Jugular Venous V Waves Present] : normal jugular venous V waves present [Heart Rate And Rhythm] : heart rate and rhythm were normal show [Heart Sounds] : normal S1 and S2 [Murmurs] : no murmurs present [] : no respiratory distress [Respiration, Rhythm And Depth] : normal respiratory rhythm and effort [Auscultation Breath Sounds / Voice Sounds] : lungs were clear to auscultation bilaterally [Bowel Sounds] : normal bowel sounds [Abdomen Tenderness] : non-tender [Abdomen Soft] : soft [Abnormal Walk] : normal gait [Nail Clubbing] : no clubbing of the fingernails [Cyanosis, Localized] : no localized cyanosis [Skin Color & Pigmentation] : normal skin color and pigmentation [Skin Turgor] : normal skin turgor [Oriented To Time, Place, And Person] : oriented to person, place, and time [Affect] : the affect was normal [FreeTextEntry1] : mild edema

## 2024-02-09 NOTE — HISTORY OF PRESENT ILLNESS
[FreeTextEntry1] : 58 y/o male with a history of HTN, DL, DM, MICKIE, CAD, s/p MI, s/p CABG in 2014. Presents for f/u after 3 years (was lost to f/u due to COVID pandemic, insurance issues). On labs - elevated TG. Last A1c in September 8.8%. Reports FS have been better with Monjaro. No recent labs. He had COVID-19 infection in May 2021, recovered. Continuing with diet and exercise. No recent labs.  Non-compliant with CPAP. Stress test in 2018 was negative for ischemia. Now c/o episodes of chest discomfort. + LE edema, right more than left. + LE pain.

## 2024-03-22 ENCOUNTER — APPOINTMENT (OUTPATIENT)
Dept: CARDIOLOGY | Facility: CLINIC | Age: 59
End: 2024-03-22

## 2024-09-25 NOTE — DISCHARGE NOTE NURSING/CASE MANAGEMENT/SOCIAL WORK - NSDCPEFALRISK_GEN_ALL_CORE
-- For information on Fall & Injury Prevention, visit: [email or web address] OR  [email or web address] OR  https://www.cdc.gov/steadi/patient.html